# Patient Record
Sex: FEMALE | Race: WHITE | Employment: UNEMPLOYED | URBAN - METROPOLITAN AREA
[De-identification: names, ages, dates, MRNs, and addresses within clinical notes are randomized per-mention and may not be internally consistent; named-entity substitution may affect disease eponyms.]

---

## 2019-03-06 LAB
HBSAG, EXTERNAL: NEGATIVE
HIV, EXTERNAL: NEGATIVE
RPR, EXTERNAL: NON REACTIVE
RUBELLA, EXTERNAL: NORMAL
TYPE, ABO & RH, EXTERNAL: NORMAL

## 2019-08-23 ENCOUNTER — HOSPITAL ENCOUNTER (OUTPATIENT)
Dept: INFUSION THERAPY | Age: 34
Discharge: HOME | End: 2019-08-23
Payer: COMMERCIAL

## 2019-08-23 VITALS
TEMPERATURE: 98.4 F | HEART RATE: 94 BPM | SYSTOLIC BLOOD PRESSURE: 117 MMHG | OXYGEN SATURATION: 99 % | RESPIRATION RATE: 18 BRPM | DIASTOLIC BLOOD PRESSURE: 72 MMHG

## 2019-08-23 LAB
ABO + RH BLD: NORMAL
BLOOD GROUP ANTIBODIES SERPL: NORMAL
SPECIMEN EXP DATE BLD: NORMAL

## 2019-08-23 PROCEDURE — 86900 BLOOD TYPING SEROLOGIC ABO: CPT

## 2019-08-23 PROCEDURE — 74011250636 HC RX REV CODE- 250/636: Performed by: NURSE PRACTITIONER

## 2019-08-23 PROCEDURE — 36415 COLL VENOUS BLD VENIPUNCTURE: CPT

## 2019-08-23 PROCEDURE — 96372 THER/PROPH/DIAG INJ SC/IM: CPT

## 2019-08-23 RX ORDER — LANOLIN ALCOHOL/MO/W.PET/CERES
CREAM (GRAM) TOPICAL
COMMUNITY
End: 2021-07-01

## 2019-08-23 RX ADMIN — HUMAN RHO(D) IMMUNE GLOBULIN 0.3 MG: 300 INJECTION, SOLUTION INTRAMUSCULAR at 11:22

## 2019-08-23 NOTE — PROGRESS NOTES
[facility] Progress Note    Date: 989    Name: Nathalie Chavira    MRN: [id number]         : 1985     RhoGam injection during pregnancy      Ms. Kenan Gracia was assessed and education was provided. [facility] care notes for RhoGam reviewed with and given to patient. Patient had Rho Enrico with her previous pregnancy and denies any reaction at that time. Ms. Jeffery Kay vitals were reviewed and patient was observed for 5 minutes prior to treatment. Visit Vitals  /72 (BP 1 Location: Right arm, BP Patient Position: Sitting)   Pulse 94   Temp 98.4 °F (36.9 °C)   Resp 18   SpO2 99%   Breastfeeding? No       Lab results were obtained and reviewed. Recent Results (from the past 12 hour(s))   TYPE & SCREEN    Collection Time: 19  9:27 AM   Result Value Ref Range    Crossmatch Expiration 2019     ABO/Rh(D) A NEGATIVE     Antibody screen NEG    RH IMMUNE GLOBULIN PROPHYLACTIC    Collection Time: 19  9:27 AM   Result Value Ref Range    No. of weeks gestation 31      CALLED TO: 1 RHIG READY TO A MACE RN INFM AT 1045 19 [facility]     Unit number [id number]     Blood component type RH IMMUNE GLOBULIN     Unit division 00     Status of unit ISSUED        RhoGam 300 mcg was administered IM in left deltoid. No irritation or drainage noted at site, band aid applied. Patient declined to stay for observation as she had her 3year old son with her. Ms. Kenan Gracia tolerated well, and had no complaints. Patient armband removed and shredded. Ms. Kenan Gracia was discharged from [place] [address] in stable condition at 1135. She has no appointment to return to [facility] but will follow up with her OB as scheduled for her next appointment.     Kylie Dominique RN  2019  11:36 AM

## 2019-08-24 LAB
BLD PROD TYP BPU: NORMAL
BPU ID: NORMAL
CALLED TO:,BCALL1: NORMAL
GA (WEEKS): 31 WK
STATUS OF UNIT,%ST: NORMAL
UNIT DIVISION, %UDIV: 0

## 2019-09-18 LAB — GRBS, EXTERNAL: POSITIVE

## 2019-10-14 ENCOUNTER — HOSPITAL ENCOUNTER (EMERGENCY)
Age: 34
Discharge: HOME | End: 2019-10-14
Attending: OBSTETRICS & GYNECOLOGY | Admitting: OBSTETRICS & GYNECOLOGY
Payer: COMMERCIAL

## 2019-10-14 VITALS
TEMPERATURE: 98.3 F | DIASTOLIC BLOOD PRESSURE: 60 MMHG | SYSTOLIC BLOOD PRESSURE: 132 MMHG | BODY MASS INDEX: 34.6 KG/M2 | WEIGHT: 188 LBS | RESPIRATION RATE: 16 BRPM | HEIGHT: 62 IN | HEART RATE: 66 BPM

## 2019-10-14 PROCEDURE — 59025 FETAL NON-STRESS TEST: CPT

## 2019-10-14 PROCEDURE — 99283 EMERGENCY DEPT VISIT LOW MDM: CPT

## 2019-10-14 NOTE — PROGRESS NOTES
1634  at 38.6 weeks ambulated onto unit from office for non-reassuring fetal heart tracing. Taken to labor room 3 and oriented to area. Patient using restroom at this time. 1648 EFM and TOCO applied    3226 Called and left message for Dr. Mimi Chan. 1847 Dr. Mimi Chan on unit. Reviewed reactive fetal heart tracing. Received discharge orders. EFM and TOCO discontinued. 1859 Pt discharged home. Verbal and written discharge instructions given including LOF, vaginal bleeding, kick counts, s/s of active labor, importance of drinking plenty of fluids, eating regular meals, and keeping scheduled appointments. Pt verbalizes understanding. Pt ambulated off unit in stable condition.

## 2019-10-14 NOTE — DISCHARGE INSTRUCTIONS
Patient Education   Patient Education   Patient Education        Learning About When to Call Your Doctor During Pregnancy (After 20 Weeks)  Your Care Instructions  It's common to have concerns about what might be a problem during pregnancy. Although most pregnant women don't have any serious problems, it's important to know when to call your doctor if you have certain symptoms or signs of labor. These are general suggestions. Your doctor may give you some more information about when to call. When to call your doctor (after 20 weeks)  Call 911 anytime you think you may need emergency care. For example, call if:  · You have severe vaginal bleeding. · You have sudden, severe pain in your belly. · You passed out (lost consciousness). · You have a seizure. · You see or feel the umbilical cord. · You think you are about to deliver your baby and can't make it safely to the hospital.  Call your doctor now or seek immediate medical care if:  · You have vaginal bleeding. · You have belly pain. · You have a fever. · You have symptoms of preeclampsia, such as:  ? Sudden swelling of your face, hands, or feet. ? New vision problems (such as dimness, blurring, or seeing spots). ? A severe headache. · You have a sudden release of fluid from your vagina. (You think your water broke.)  · You think that you may be in labor. This means that you've had at least 6 contractions in an hour. · You notice that your baby has stopped moving or is moving much less than normal.  · You have symptoms of a urinary tract infection. These may include:  ? Pain or burning when you urinate. ? A frequent need to urinate without being able to pass much urine. ? Pain in the flank, which is just below the rib cage and above the waist on either side of the back. ? Blood in your urine. Watch closely for changes in your health, and be sure to contact your doctor if:  · You have vaginal discharge that smells bad.   · You have skin changes, such as:  ? A rash. ? Itching. ? Yellow color to your skin. · You have other concerns about your pregnancy. If you have labor signs at 37 weeks or more  If you have signs of labor at 37 weeks or more, your doctor may tell you to call when your labor becomes more active. Symptoms of active labor include:  · Contractions that are regular. · Contractions that are less than 5 minutes apart. · Contractions that are hard to talk through. Follow-up care is a key part of your treatment and safety. Be sure to make and go to all appointments, and call your doctor if you are having problems. It's also a good idea to know your test results and keep a list of the medicines you take. Where can you learn more? Go to http://max-yvan.info/. Enter  in the search box to learn more about \"Learning About When to Call Your Doctor During Pregnancy (After 20 Weeks). \"  Current as of: May 29, 2019  Content Version: 12.2  © 4329-4439 [employer]. Care instructions adapted under license by [employer] (which disclaims liability or warranty for this information). If you have questions about a medical condition or this instruction, always ask your healthcare professional. [place] [address] any warranty or liability for your use of this information. Counting Your Baby's Kicks: Care Instructions  Your Care Instructions    Counting your baby's kicks is one way your doctor can tell that your baby is healthy. Most women--especially in a first pregnancy--feel their baby move for the first time between 16 and 22 weeks. The movement may feel like flutters rather than kicks. Your baby may move more at certain times of the day. When you are active, you may notice less kicking than when you are resting. At your prenatal visits, your doctor will ask whether the baby is active.   In your last trimester, your doctor may ask you to count the number of times you feel your baby move.  Follow-up care is a key part of your treatment and safety. Be sure to make and go to all appointments, and call your doctor if you are having problems. It's also a good idea to know your test results and keep a list of the medicines you take. How do you count fetal kicks? · A common method of checking your baby's movement is to count the number of kicks or moves you feel in 1 hour. Ten movements (such as kicks, flutters, or rolls) in 1 hour are normal. Some doctors suggest that you count in the morning until you get to 10 movements. Then you can quit for that day and start again the next day. · Pick your baby's most active time of day to count. This may be any time from morning to evening. · If you do not feel 10 movements in an hour, your baby may be sleeping. Wait for the next hour and count again. When should you call for help? Call your doctor now or seek immediate medical care if:    · You noticed that your baby has stopped moving or is moving much less than normal.    Watch closely for changes in your health, and be sure to contact your doctor if you have any problems. Where can you learn more? Go to http://max-yvan.info/. Enter Q495 in the search box to learn more about \"Counting Your Baby's Kicks: Care Instructions. \"  Current as of: May 29, 2019  Content Version: 12.2  © 2056-7058 [employer]. Care instructions adapted under license by [employer] (which disclaims liability or warranty for this information). If you have questions about a medical condition or this instruction, always ask your healthcare professional. [address] any warranty or liability for your use of this information. Pregnancy Precautions: Care Instructions  Your Care Instructions    There is no sure way to prevent labor before your due date ( labor) or to prevent most other pregnancy problems.  But there are things you can do to increase your chances of a healthy pregnancy. Go to your appointments, follow your doctor's advice, and take good care of yourself. Eat well, and exercise (if your doctor agrees). And make sure to drink plenty of water. Follow-up care is a key part of your treatment and safety. Be sure to make and go to all appointments, and call your doctor if you are having problems. It's also a good idea to know your test results and keep a list of the medicines you take. How can you care for yourself at home? · Make sure you go to your prenatal appointments. At each visit, your doctor will check your blood pressure. Your doctor will also check to see if you have protein in your urine. High blood pressure and protein in urine are signs of preeclampsia. This condition can be dangerous for you and your baby. · Drink plenty of fluids, enough so that your urine is light yellow or clear like water. Dehydration can cause contractions. If you have kidney, heart, or liver disease and have to limit fluids, talk with your doctor before you increase the amount of fluids you drink. · Tell your doctor right away if you notice any symptoms of an infection, such as:  ? Burning when you urinate. ? A foul-smelling discharge from your vagina. ? Vaginal itching. ? Unexplained fever. ? Unusual pain or soreness in your uterus or lower belly. · Eat a balanced diet. Include plenty of foods that are high in calcium and iron. ? Foods high in calcium include milk, cheese, yogurt, almonds, and broccoli. ? Foods high in iron include red meat, shellfish, poultry, eggs, beans, raisins, whole-grain bread, and leafy green vegetables. · Do not smoke. If you need help quitting, talk to your doctor about stop-smoking programs and medicines. These can increase your chances of quitting for good. · Do not drink alcohol or use illegal drugs. · Follow your doctor's directions about activity.  Your doctor will let you know how much, if any, exercise you can do. · Ask your doctor if you can have sex. If you are at risk for early labor, your doctor may ask you to not have sex. · Take care to prevent falls. During pregnancy, your joints are loose, and your balance is off. Sports such as bicycling, skiing, or in-line skating can increase your risk of falling. And don't ride horses or motorcycles, dive, water ski, scuba dive, or parachute jump while you are pregnant. · Avoid getting very hot. Do not use saunas or hot tubs. Avoid staying out in the sun in hot weather for long periods. Take acetaminophen (Tylenol) to lower a high fever. · Do not take any over-the-counter or herbal medicines or supplements without talking to your doctor or pharmacist first.  When should you call for help? Call 911 anytime you think you may need emergency care. For example, call if:    · You passed out (lost consciousness).     · You have a seizure.     · You have severe vaginal bleeding.     · You have severe pain in your belly or pelvis.     · You have had fluid gushing or leaking from your vagina and you know or think the umbilical cord is bulging into your vagina. If this happens, immediately get down on your knees so your rear end (buttocks) is higher than your head. This will decrease the pressure on the cord until help arrives.   [facility] your doctor now or seek immediate medical care if:    · You have signs of preeclampsia, such as:  ? Sudden swelling of your face, hands, or feet. ? New vision problems (such as dimness, blurring, or seeing spots). ? A severe headache.     · You have any vaginal bleeding.     · You have belly pain or cramping.     · You have a fever.     · You have had regular contractions (with or without pain) for an hour.  This means that you have 8 or more within 1 hour or 4 or more in 20 minutes after you change your position and drink fluids.     · You have a sudden release of fluid from your vagina.     · You have low back pain or pelvic pressure that does not go away.     · You notice that your baby has stopped moving or is moving much less than normal.    Watch closely for changes in your health, and be sure to contact your doctor if you have any problems. Where can you learn more? Go to http://max-yvan.info/. Enter [id number] in the search box to learn more about \"Pregnancy Precautions: Care Instructions. \"  Current as of: May 29, 2019  Content Version: 12.2  © 2521-0328 [employer]. Care instructions adapted under license by [employer] (which disclaims liability or warranty for this information). If you have questions about a medical condition or this instruction, always ask your healthcare professional. [address] any warranty or liability for your use of this information.

## 2019-10-20 ENCOUNTER — HOSPITAL ENCOUNTER (INPATIENT)
Age: 34
LOS: 2 days | Discharge: HOME | End: 2019-10-22
Attending: OBSTETRICS & GYNECOLOGY | Admitting: OBSTETRICS & GYNECOLOGY
Payer: COMMERCIAL

## 2019-10-20 PROBLEM — Z33.1 IUP (INTRAUTERINE PREGNANCY), INCIDENTAL: Status: ACTIVE | Noted: 2019-10-20

## 2019-10-20 LAB
ABO + RH BLD: NORMAL
BASOPHILS # BLD: 0 K/UL (ref 0–0.1)
BASOPHILS NFR BLD: 0 % (ref 0–2)
BLOOD BANK CMNT PATIENT-IMP: NORMAL
BLOOD GROUP ANTIBODIES SERPL: NORMAL
BLOOD GROUP ANTIBODIES SERPL: NORMAL
DIFFERENTIAL METHOD BLD: ABNORMAL
EOSINOPHIL # BLD: 0.1 K/UL (ref 0–0.4)
EOSINOPHIL NFR BLD: 1 % (ref 0–5)
ERYTHROCYTE [DISTWIDTH] IN BLOOD BY AUTOMATED COUNT: 14.7 % (ref 11.6–14.5)
HCT VFR BLD AUTO: 38.6 % (ref 35–45)
HGB BLD-MCNC: 12.5 G/DL (ref 12–16)
LYMPHOCYTES # BLD: 1.9 K/UL (ref 0.9–3.6)
LYMPHOCYTES NFR BLD: 16 % (ref 21–52)
MCH RBC QN AUTO: 28 PG (ref 24–34)
MCHC RBC AUTO-ENTMCNC: 32.4 G/DL (ref 31–37)
MCV RBC AUTO: 86.5 FL (ref 74–97)
MONOCYTES # BLD: 0.9 K/UL (ref 0.05–1.2)
MONOCYTES NFR BLD: 8 % (ref 3–10)
NEUTS SEG # BLD: 8.6 K/UL (ref 1.8–8)
NEUTS SEG NFR BLD: 75 % (ref 40–73)
PLATELET # BLD AUTO: 215 K/UL (ref 135–420)
PMV BLD AUTO: 11.1 FL (ref 9.2–11.8)
RBC # BLD AUTO: 4.46 M/UL (ref 4.2–5.3)
SPECIMEN EXP DATE BLD: NORMAL
WBC # BLD AUTO: 11.5 K/UL (ref 4.6–13.2)

## 2019-10-20 PROCEDURE — 74011250636 HC RX REV CODE- 250/636

## 2019-10-20 PROCEDURE — 75410000003 HC RECOV DEL/VAG/CSECN EA 0.5 HR

## 2019-10-20 PROCEDURE — 75410000002 HC LABOR FEE PER 1 HR

## 2019-10-20 PROCEDURE — 86870 RBC ANTIBODY IDENTIFICATION: CPT

## 2019-10-20 PROCEDURE — 75410000000 HC DELIVERY VAGINAL/SINGLE

## 2019-10-20 PROCEDURE — 74011250637 HC RX REV CODE- 250/637: Performed by: OBSTETRICS & GYNECOLOGY

## 2019-10-20 PROCEDURE — 74011250636 HC RX REV CODE- 250/636: Performed by: OBSTETRICS & GYNECOLOGY

## 2019-10-20 PROCEDURE — 85025 COMPLETE CBC W/AUTO DIFF WBC: CPT

## 2019-10-20 PROCEDURE — 59025 FETAL NON-STRESS TEST: CPT

## 2019-10-20 PROCEDURE — 75410000001 HC DELIVERY VAGINAL/MULTIPLE

## 2019-10-20 PROCEDURE — 99281 EMR DPT VST MAYX REQ PHY/QHP: CPT

## 2019-10-20 PROCEDURE — 65270000029 HC RM PRIVATE

## 2019-10-20 PROCEDURE — 86900 BLOOD TYPING SEROLOGIC ABO: CPT

## 2019-10-20 PROCEDURE — 74011250637 HC RX REV CODE- 250/637

## 2019-10-20 RX ORDER — IBUPROFEN 400 MG/1
800 TABLET ORAL
Status: DISCONTINUED | OUTPATIENT
Start: 2019-10-20 | End: 2019-10-22 | Stop reason: HOSPADM

## 2019-10-20 RX ORDER — PROMETHAZINE HYDROCHLORIDE 25 MG/ML
25 INJECTION, SOLUTION INTRAMUSCULAR; INTRAVENOUS
Status: DISCONTINUED | OUTPATIENT
Start: 2019-10-20 | End: 2019-10-22 | Stop reason: HOSPADM

## 2019-10-20 RX ORDER — ONDANSETRON 2 MG/ML
4 INJECTION INTRAMUSCULAR; INTRAVENOUS
Status: DISCONTINUED | OUTPATIENT
Start: 2019-10-20 | End: 2019-10-20 | Stop reason: HOSPADM

## 2019-10-20 RX ORDER — MINERAL OIL
OIL (ML) ORAL
Status: COMPLETED
Start: 2019-10-20 | End: 2019-10-20

## 2019-10-20 RX ORDER — ZOLPIDEM TARTRATE 5 MG/1
5 TABLET ORAL
Status: DISCONTINUED | OUTPATIENT
Start: 2019-10-20 | End: 2019-10-22 | Stop reason: HOSPADM

## 2019-10-20 RX ORDER — OXYTOCIN/0.9 % SODIUM CHLORIDE 20/1000 ML
PLASTIC BAG, INJECTION (ML) INTRAVENOUS
Status: COMPLETED
Start: 2019-10-20 | End: 2019-10-20

## 2019-10-20 RX ORDER — MINERAL OIL
30 OIL (ML) ORAL AS NEEDED
Status: DISCONTINUED | OUTPATIENT
Start: 2019-10-20 | End: 2019-10-20 | Stop reason: HOSPADM

## 2019-10-20 RX ORDER — HYDROMORPHONE HYDROCHLORIDE 1 MG/ML
1 INJECTION, SOLUTION INTRAMUSCULAR; INTRAVENOUS; SUBCUTANEOUS
Status: DISCONTINUED | OUTPATIENT
Start: 2019-10-20 | End: 2019-10-20 | Stop reason: HOSPADM

## 2019-10-20 RX ORDER — BUTORPHANOL TARTRATE 2 MG/ML
2 INJECTION INTRAMUSCULAR; INTRAVENOUS
Status: DISCONTINUED | OUTPATIENT
Start: 2019-10-20 | End: 2019-10-20 | Stop reason: HOSPADM

## 2019-10-20 RX ORDER — OXYTOCIN/0.9 % SODIUM CHLORIDE 20/1000 ML
999 PLASTIC BAG, INJECTION (ML) INTRAVENOUS ONCE
Status: COMPLETED | OUTPATIENT
Start: 2019-10-20 | End: 2019-10-20

## 2019-10-20 RX ORDER — ACETAMINOPHEN 325 MG/1
650 TABLET ORAL
Status: DISCONTINUED | OUTPATIENT
Start: 2019-10-20 | End: 2019-10-22 | Stop reason: HOSPADM

## 2019-10-20 RX ORDER — LIDOCAINE HYDROCHLORIDE 10 MG/ML
20 INJECTION, SOLUTION EPIDURAL; INFILTRATION; INTRACAUDAL; PERINEURAL AS NEEDED
Status: DISCONTINUED | OUTPATIENT
Start: 2019-10-20 | End: 2019-10-20 | Stop reason: HOSPADM

## 2019-10-20 RX ORDER — NALBUPHINE HYDROCHLORIDE 10 MG/ML
10 INJECTION, SOLUTION INTRAMUSCULAR; INTRAVENOUS; SUBCUTANEOUS
Status: DISCONTINUED | OUTPATIENT
Start: 2019-10-20 | End: 2019-10-20 | Stop reason: HOSPADM

## 2019-10-20 RX ORDER — OXYTOCIN/0.9 % SODIUM CHLORIDE 20/1000 ML
125 PLASTIC BAG, INJECTION (ML) INTRAVENOUS CONTINUOUS
Status: DISCONTINUED | OUTPATIENT
Start: 2019-10-20 | End: 2019-10-20 | Stop reason: HOSPADM

## 2019-10-20 RX ORDER — OXYCODONE AND ACETAMINOPHEN 5; 325 MG/1; MG/1
2 TABLET ORAL
Status: DISCONTINUED | OUTPATIENT
Start: 2019-10-20 | End: 2019-10-22 | Stop reason: HOSPADM

## 2019-10-20 RX ORDER — AMOXICILLIN 250 MG
1 CAPSULE ORAL
Status: DISCONTINUED | OUTPATIENT
Start: 2019-10-20 | End: 2019-10-22 | Stop reason: HOSPADM

## 2019-10-20 RX ORDER — METHYLERGONOVINE MALEATE 0.2 MG/ML
0.2 INJECTION INTRAVENOUS AS NEEDED
Status: DISCONTINUED | OUTPATIENT
Start: 2019-10-20 | End: 2019-10-20 | Stop reason: HOSPADM

## 2019-10-20 RX ORDER — TERBUTALINE SULFATE 1 MG/ML
0.25 INJECTION SUBCUTANEOUS
Status: DISCONTINUED | OUTPATIENT
Start: 2019-10-20 | End: 2019-10-20 | Stop reason: HOSPADM

## 2019-10-20 RX ORDER — SODIUM CHLORIDE, SODIUM LACTATE, POTASSIUM CHLORIDE, CALCIUM CHLORIDE 600; 310; 30; 20 MG/100ML; MG/100ML; MG/100ML; MG/100ML
125 INJECTION, SOLUTION INTRAVENOUS CONTINUOUS
Status: DISCONTINUED | OUTPATIENT
Start: 2019-10-20 | End: 2019-10-20 | Stop reason: HOSPADM

## 2019-10-20 RX ORDER — CLINDAMYCIN PHOSPHATE 900 MG/50ML
900 INJECTION, SOLUTION INTRAVENOUS ONCE
Status: COMPLETED | OUTPATIENT
Start: 2019-10-20 | End: 2019-10-20

## 2019-10-20 RX ADMIN — CLINDAMYCIN PHOSPHATE 900 MG: 900 INJECTION, SOLUTION INTRAVENOUS at 06:25

## 2019-10-20 RX ADMIN — Medication 19980 MILLI-UNITS/HR: at 06:56

## 2019-10-20 RX ADMIN — Medication 125 ML/HR: at 07:14

## 2019-10-20 RX ADMIN — MINERAL 30 ML: 999 OIL ORAL at 06:40

## 2019-10-20 RX ADMIN — IBUPROFEN 800 MG: 400 TABLET, FILM COATED ORAL at 20:40

## 2019-10-20 RX ADMIN — IBUPROFEN 800 MG: 400 TABLET, FILM COATED ORAL at 07:32

## 2019-10-20 RX ADMIN — SODIUM CHLORIDE, SODIUM LACTATE, POTASSIUM CHLORIDE, AND CALCIUM CHLORIDE: 600; 310; 30; 20 INJECTION, SOLUTION INTRAVENOUS at 06:25

## 2019-10-20 NOTE — PROGRESS NOTES
0710 Bedside and Verbal shift change report given to [address] [place] [address] Dr RN  (oncoming nurse) by Joseline Cardenas RN (offgoing nurse). Report included the following information SBAR, Kardex, Procedure Summary, Intake/Output, MAR, Recent Results and Med Rec Status. 1206 TRANSFER - OUT REPORT:    Verbal report given to JENNYFER Melchor RN (name) on Michael Veloz  being transferred to [facility](unit) for routine progression of care       Report consisted of patients Situation, Background, Assessment and   Recommendations(SBAR). Information from the following report(s) SBAR, Kardex, Procedure Summary, Intake/Output, MAR, Recent Results and Med Rec Status was reviewed with the receiving nurse. Lines:   Peripheral IV 10/20/19 Right Antecubital (Active)   Site Assessment Clean, dry, & intact 10/20/2019 11:55 AM   Phlebitis Assessment 0 10/20/2019 11:55 AM   Infiltration Assessment 0 10/20/2019 11:55 AM   Dressing Status Clean, dry, & intact 10/20/2019 11:55 AM   Dressing Type Tape 10/20/2019 11:55 AM   Hub Color/Line Status Pink 10/20/2019 11:55 AM   Alcohol Cap Used Yes 10/20/2019 11:55 AM        Opportunity for questions and clarification was provided.       Patient transported with:   Registered Nurse

## 2019-10-20 NOTE — H&P
Ostetrical History and Physical    Subjective:     Date of Admission: 10/20/2019    Patient is a 26 y.o.  female admitted with labor . Arrived 3 cm rapidly progressed to ant lip. For Obstetric history, see prenantal.    Past Medical History:   Diagnosis Date    Anemia     Breast disorder     lump in left breast (fibroid)    Disease of blood and blood forming organ       No past surgical history on file. Prior to Admission medications    Medication Sig Start Date End Date Taking? Authorizing Provider   PNV Comb #2-Iron-Omega 3-FA (TRUST THERON DHA) 29-1-250 mg cmpk Take  by mouth. Provider, Historical   ferrous sulfate (IRON) 325 mg (65 mg iron) tablet Take  by mouth Daily (before breakfast). Provider, Historical     Allergies   Allergen Reactions    Amoxicillin-Pot Clavulanate Hives      Social History     Tobacco Use    Smoking status: Never Smoker    Smokeless tobacco: Never Used   Substance Use Topics    Alcohol use: Not Currently      No family history on file. Review of Systems    Objective:     Blood pressure 144/85, pulse 72, temperature 98.1 °F (36.7 °C), resp. rate 18, height 5' 2\" (1.575 m), weight 85.3 kg (188 lb). Temp (24hrs), Av.1 °F (36.7 °C), Min:98.1 °F (36.7 °C), Max:98.1 °F (36.7 °C)        No intake/output data recorded. No intake/output data recorded.     @[email or web address]    Pelvic: [id number], Effaced:> 50%Station:0  Data Review:   Recent Results (from the past 24 hour(s))   CBC WITH AUTOMATED DIFF    Collection Time: 10/20/19  6:20 AM   Result Value Ref Range    WBC 11.5 4.6 - 13.2 K/uL    RBC 4.46 4.20 - 5.30 M/uL    HGB 12.5 12.0 - 16.0 g/dL    HCT 38.6 35.0 - 45.0 %    MCV 86.5 74.0 - 97.0 FL    MCH 28.0 24.0 - 34.0 PG    MCHC 32.4 31.0 - 37.0 g/dL    RDW 14.7 (H) 11.6 - 14.5 %    PLATELET 144 643 - 805 K/uL    MPV 11.1 9.2 - 11.8 FL    NEUTROPHILS 75 (H) 40 - 73 %    LYMPHOCYTES 16 (L) 21 - 52 %    MONOCYTES 8 3 - 10 %    EOSINOPHILS 1 0 - 5 %    BASOPHILS 0 0 - 2 % ABS. NEUTROPHILS 8.6 (H) 1.8 - 8.0 K/UL    ABS. LYMPHOCYTES 1.9 0.9 - 3.6 K/UL    ABS. MONOCYTES 0.9 0.05 - 1.2 K/UL    ABS. EOSINOPHILS 0.1 0.0 - 0.4 K/UL    ABS.  BASOPHILS 0.0 0.0 - 0.1 K/UL    DF AUTOMATED       Monitor:  Reactivity:present Variability:present Baseline:within normal limits    Assessment:     Active Problems:    IUP (intrauterine pregnancy), incidental (10/20/2019)        Plan:     Prophylaxis:  Deep Vein Thrombosis Protocol Active:Yes    Check labs:    Check  Prenatal:    Disposition    Total time spent with patient:In-Patient    Signed By: Gela Ortiz MD                         October 20, 2019

## 2019-10-20 NOTE — PROGRESS NOTES
1206  TRANSFER - IN REPORT:    Verbal report received from JIMMY Parker RN (name) on Latoya Blow  being received from labor and delivery(unit) for routine progression of care      Report consisted of patients Situation, Background, Assessment and   Recommendations(SBAR). Information from the following report(s) SBAR, Kardex, Intake/Output, MAR and Recent Results was reviewed with the receiving nurse. Opportunity for questions and clarification was provided. Assessment completed upon patients arrival to unit and care assumed. 1900 Bedside and Verbal shift change report given to JENNY Orona RN and Esha Au RN (oncoming nurse) by Micaela Acosta RN (offgoing nurse). Report included the following information SBAR, Kardex, Intake/Output, MAR and Recent Results.

## 2019-10-20 NOTE — PROGRESS NOTES
Delivery Note    Obstetrician: kory    Assistant: none    Pre-Delivery Diagnosis: Term pregnancy    Post-Delivery Diagnosis: Female    Intrapartum Event: None    Procedure: Spontaneous vaginal delivery    Epidural: NO    Monitor:  Fetal Heart Tones - External and Uterine Contractions - External    Indications for instrumental delivery: none    Estimated Blood Loss:     Episiotomy: none    Laceration(s):  none    Laceration(s) repair: NO    Presentation: Cephalic    Fetal Description: perez    Fetal Position: Occiput Anterior    Birth Weight:     Birth Length:     Apgar - One Minute: 8    Apgar - Five Minutes: 9    Umbilical Cord: True knot and 3 vessels present    Specimens: none           Complications:  none           Cord Blood Results:   Information for the patient's :  Mick Sears [[id number]]   No results found for: PCTABR, ABORH, PCTDIG, BILI, ABORH, ABORHEXT    Prenatal Labs:     Lab Results   Component Value Date/Time    ABO/Rh(D) A NEGATIVE 2019 09:27 AM        Attending Attestation: I was present and scrubbed for the entire procedure    Signed By:  Niurka Buchanan MD     2019

## 2019-10-20 NOTE — PROGRESS NOTES
1900- Bedside report received from ALEKSEY Angeles RN. Pt. Stable. Needs addressed. Callbell within reach. 2040- Pt. Joined at bedside baby. AAOx4. Vital signs stable. Will continue to monitor. Pain 5/10. Educated on pain management. Pain medication administered as ordered. IV located at right [facility], capped. Clean, dry, intact. Whiteboard updated. Educated on plan of care and signs and symptoms to report. Educated on bulb syringe use. No further questions on concerns at this time. Fundus firm at U-1, midline , small rubra lochia. No clots noted. Assessment complete. Callbell within reach. Bed in lowest position. Baby supine, swaddled in bassinet. 2257- Baby transported to nursery supine via bassinet for shift assessment. HUGs and bands present, secure, and verified with MOB prior to leaving room. 2319- Baby transported back to rooming in supine via bassinet. HUGs and bands present, secure, and verified with MOB upon arrival to rooming in. MOB denies any current needs. Bed in lowest position. Call bell within reach. 2353- Pt supine in bed, baby skin to skin. Pt denies any current needs. Bed in lowest position. Call bell within reach. Denies any current pain, rated 0/10.    0140- Pt resting bed comfortably in bed.     0314- Pt bonding with baby in bed. Denies any current needs. Pain 0/10. Bed in lowest position. Call bell within reach. 0423- Pt resting supine in bed, bonding with baby. No needs expressed at this time. Call bell within reach. Bed in lowest position. 7841- INT removed. Cath tip intact. MOB encouraged to feed baby, pt verbalizes understanding. Bed in lowest position. Pain 0/10. Pt denies any current needs. Bed in lowest position. Call bell within reach. 0710- Bedside and Verbal shift change report given to ELLIS Mazariegos LPN (oncoming nurse) by JENNY [address] (offgoing nurse). Report included the following information SBAR, Kardex, Intake/Output, MAR and Recent Results.

## 2019-10-20 NOTE — PROGRESS NOTES
3137 - Patient arrived to unit via ambulation as a  at 39.5 weeks with complaints of contractions every 5 minute starting at 0300. She states that she is unsure of ROM. She states that she was up to void and felt as if her membranes may have ruptured. She reports no LOF on the way to hospital. Patient taken to TLR1 and given gown to change. 9394 - EFM and TOCO applied. Assessment complete. 0530 - SVE 3-4/80/-2  0540 - Patient up to bathroom. Reactive strip reviewed with Katherine Machado RN. Patient off monitor to ambulate. Fany pad given to check for ROM.  0605 - SVE 9.5/100/0  1161 - Dr. Yoli Cerna called via cell phone, report given on patient and SVE. MD headed to unit. Patient moved to BR 4. Orders for admission placed   0618 - IV started, Fluid bolus. 6859 - Clindamycin started. 0630 - patient request epidrual, Dr. Georgia Pollard called via cell. 9615 - Dr. Yoli Cerna at bedside  4781 - SVE 10/100/+2, pushing  6063 -  viable baby girl, placed skin to skin with mom. 4203 - placenta delivered   0700 - fany care performed, pad with ice pack placed. 0710 - Bedside and Verbal shift change report given to JIMMY Parker RN (oncoming nurse) by Anali Garcia RN (offgoing nurse). Report included the following information SBAR, Kardex, Intake/Output, MAR and Recent Results.

## 2019-10-21 LAB
HCT VFR BLD AUTO: 35.2 % (ref 35–45)
HGB BLD-MCNC: 11.3 G/DL (ref 12–16)

## 2019-10-21 PROCEDURE — 85018 HEMOGLOBIN: CPT

## 2019-10-21 PROCEDURE — 74011250636 HC RX REV CODE- 250/636: Performed by: OBSTETRICS & GYNECOLOGY

## 2019-10-21 PROCEDURE — 85461 HEMOGLOBIN FETAL: CPT

## 2019-10-21 PROCEDURE — 85014 HEMATOCRIT: CPT

## 2019-10-21 PROCEDURE — 3E0234Z INTRODUCTION OF SERUM, TOXOID AND VACCINE INTO MUSCLE, PERCUTANEOUS APPROACH: ICD-10-PCS | Performed by: OBSTETRICS & GYNECOLOGY

## 2019-10-21 PROCEDURE — 86900 BLOOD TYPING SEROLOGIC ABO: CPT

## 2019-10-21 PROCEDURE — 36415 COLL VENOUS BLD VENIPUNCTURE: CPT

## 2019-10-21 PROCEDURE — 65270000029 HC RM PRIVATE

## 2019-10-21 RX ADMIN — HUMAN RHO(D) IMMUNE GLOBULIN 0.3 MG: 300 INJECTION, SOLUTION INTRAMUSCULAR at 08:58

## 2019-10-21 NOTE — PROGRESS NOTES
Progress Note    Patient: Karma Colin MRN: [id number]  SSN: [id number]    YOB: 1985  Age: 26 y.o. Sex: female      Subjective:     Postpartum Day: 1     Delivery: vaginal delivery    The patient feels well. The patient denies emotional concerns. The baby iswell. Baby is feeding via breast.  The patient is ambulating well. The patient  tolerating a normal diet. Flatus has been passed. Objective:      Patient Vitals for the past 8 hrs:   BP Temp Pulse Resp SpO2   10/21/19 0757 113/55 98.3 °F (36.8 °C) 75 17 99 %     LABS: Recent Results (from the past 24 hour(s))   HEMOGLOBIN    Collection Time: 10/21/19  4:22 AM   Result Value Ref Range    HGB 11.3 (L) 12.0 - 16.0 g/dL   HEMATOCRIT    Collection Time: 10/21/19  4:22 AM   Result Value Ref Range    HCT 35.2 35.0 - 45.0 %   RH IMMUNE GLOBULIN EVAL-LAB ORDER    Collection Time: 10/21/19  4:22 AM   Result Value Ref Range    ABO/Rh(D) A NEGATIVE     Fetal screen NEG     Cord Blood Type A  POS       CALLED TO: 1 RHIG READY TO Aisha Lim RN 2N AT 6049 10/21/19 [facility]     Unit number [id number]     Blood component type RH IMMUNE GLOBULIN     Unit division 00     Status of unit ALLOCATED           Lochia:  appropriate   Uterine Fundus:   firm   Fundus Location:  -1   Incision:  no significant drainage   DVT Evaluation:  No evidence of DVT seen on physical exam.     Lab/Data Review: All lab results for the last 24 hours reviewed. Assessment:     Status post: Doing well postpartum vaginal delivery     Plan:     Postpartum care discussed including diet, ambulation, and actvitiy restrictions. Discharge instructions and questions answered for vaginal delivery.     Signed By: Garrick Witt MD     October 21, 2019

## 2019-10-21 NOTE — PROGRESS NOTES
Problem: Pain  Goal: *Control of Pain  Outcome: Progressing Towards Goal     Problem: Vaginal Delivery: Postpartum Day 1  Goal: Activity/Safety  Outcome: Progressing Towards Goal  Goal: Consults, if ordered  Outcome: Progressing Towards Goal  Goal: Diagnostic Test/Procedures  Outcome: Progressing Towards Goal  Goal: Nutrition/Diet  Outcome: Progressing Towards Goal  Goal: Discharge Planning  Outcome: Progressing Towards Goal  Goal: Medications  Outcome: Progressing Towards Goal  Goal: Treatments/Interventions/Procedures  Outcome: Progressing Towards Goal  Goal: Psychosocial  Outcome: Progressing Towards Goal  Goal: *Vital signs within defined limits  Outcome: Progressing Towards Goal  Goal: *Labs within defined limits  Outcome: Progressing Towards Goal  Goal: *Hemodynamically stable  Outcome: Progressing Towards Goal  Goal: *Optimal pain control at patient's stated goal  Outcome: Progressing Towards Goal  Goal: *Participates in infant care  Outcome: Progressing Towards Goal  Goal: *Demonstrates progressive activity  Outcome: Progressing Towards Goal  Goal: *Performs self perineal care  Outcome: Progressing Towards Goal  Goal: *Appropriate parent-infant bonding  Outcome: Progressing Towards Goal     CRISTI JUNG

## 2019-10-21 NOTE — PROGRESS NOTES
Problem: Vaginal Delivery: Postpartum Day 1  Goal: Activity/Safety  Outcome: Progressing Towards Goal  Goal: Consults, if ordered  Outcome: Progressing Towards Goal  Goal: Diagnostic Test/Procedures  Outcome: Progressing Towards Goal  Goal: Nutrition/Diet  Outcome: Progressing Towards Goal  Goal: Discharge Planning  Outcome: Progressing Towards Goal  Goal: Medications  Outcome: Progressing Towards Goal  Goal: Treatments/Interventions/Procedures  Outcome: Progressing Towards Goal  Goal: Psychosocial  Outcome: Progressing Towards Goal  Goal: *Vital signs within defined limits  Outcome: Progressing Towards Goal  Goal: *Labs within defined limits  Outcome: Progressing Towards Goal  Goal: *Hemodynamically stable  Outcome: Progressing Towards Goal  Goal: *Optimal pain control at patient's stated goal  Outcome: Progressing Towards Goal  Goal: *Participates in infant care  Outcome: Progressing Towards Goal  Goal: *Demonstrates progressive activity  Outcome: Progressing Towards Goal  Goal: *Performs self perineal care  Outcome: Progressing Towards Goal  Goal: *Appropriate parent-infant bonding  Outcome: Progressing Towards Goal  Goal: *Performs self breast care  Outcome: Progressing Towards Goal

## 2019-10-21 NOTE — PROGRESS NOTES
Assumed care of pt.  0755-assessment completed. Rhogam consent signed. Denies needs. 0858-rhogam given. 0915-VSS. No reaction to rhogam noted. 1045-breast feeding. 1220-breast feeding. 1340-up in room denies needs. 1430-resting in bed. Denies needs. 1550-assessment completed. Denies needs. 1645-resting in bed. Denies needs. 1800-eating dinner. Denies needs. 1915-Bedside and Verbal shift change report given to DARLEEN Stallworth RN  (oncoming nurse) by ELLIS Mazariegos LPN (offgoing nurse). Report given with SBAR, Kardex, Intake/Output, MAR and Recent Results.

## 2019-10-21 NOTE — LACTATION NOTE
Per mom, baby just finished eating, but still wants to suck. Discussed cluster feeding and nutritive vs non nutritive sucking motions. Mom educated on breastfeeding basics--hunger cues, feeding on demand, waking baby if baby sleeps too long between feeds, importance of skin to skin, positioning and latching, risk of pacifier use and supplemental feedings, and importance of rooming in--and use of log sheet. Mom also educated on benefits of breastfeeding for herself and baby. Mom verbalized understanding. No questions at this time.

## 2019-10-22 VITALS
BODY MASS INDEX: 34.6 KG/M2 | RESPIRATION RATE: 16 BRPM | SYSTOLIC BLOOD PRESSURE: 118 MMHG | WEIGHT: 188 LBS | TEMPERATURE: 98.7 F | HEIGHT: 62 IN | HEART RATE: 68 BPM | DIASTOLIC BLOOD PRESSURE: 67 MMHG | OXYGEN SATURATION: 100 %

## 2019-10-22 LAB
ABO + RH BLD: NORMAL
ABO + RH BLDCO: NORMAL
BLD PROD TYP BPU: NORMAL
BPU ID: NORMAL
CALLED TO:,BCALL1: NORMAL
FETAL SCREEN,FMHS: NORMAL
STATUS OF UNIT,%ST: NORMAL
UNIT DIVISION, %UDIV: 0

## 2019-10-22 RX ORDER — IBUPROFEN 800 MG/1
800 TABLET ORAL
Qty: 30 TAB | Refills: 0 | Status: SHIPPED | OUTPATIENT
Start: 2019-10-22 | End: 2021-07-01

## 2019-10-22 NOTE — DISCHARGE INSTRUCTIONS
POST DELIVERY DISCHARGE INSTRUCTIONS    Name: Oscar Duarte  YOB: 1985  Primary Diagnosis: Active Problems:    IUP (intrauterine pregnancy), incidental (10/20/2019)        General:     Diet/Diet Restrictions:  Eight 8-ounce glasses of fluid daily (water, juices); avoid excessive caffeine intake. Meals/snacks as desired which are high in fiber and carbohydrates and low in fat and cholesterol. Physical Activity / Restrictions / Safety:     Avoid heavy lifting, no more that 8 lbs. For 2-3 weeks; Avoid intercourse 4-6 weeks, no douching or tampon use. Check with obstetrician before starting or resuming an exercise program.         Discharge Instructions/Special Treatment/Home Care Needs:     Continue prenatal vitamins. Continue to use squirt bottle with warm water on your episiotomy after each bathroom use until bleeding stops. Call your doctor for the following:     Fever over 100.4 degrees by mouth. Vaginal bleeding heavier than a normal menstrual period or clot larger than a golf ball. Red streaks or increased swelling of legs, painful red streaks on your breast.  Painful urination, constipation and increased pain or swelling or discharge with your incision. If you feel extremely anxious or overwhelmed. If you have thoughts of harming yourself and/or your baby. Pain Management:     Pain Management:   Take Acetaminophen (Tylenol) or Ibuprofen (Advil, Motrin), as directed for pain. Use a warm Sitz bath 3 times daily to relieve episiotomy or hemorrhoidal discomfort. Heating pad to  incision as needed. For hemorrhoidal discomfort, use Tucks and Anusol cream as needed and directed. Follow-Up Care: These are general instructions for a healthy lifestyle:    No smoking/ No tobacco products/ Avoid exposure to second hand smoke    Surgeon General's Warning:  Quitting smoking now greatly reduces serious risk to your health.     Obesity, smoking, and sedentary lifestyle greatly increases your risk for illness    A healthy diet, regular physical exercise & weight monitoring are important for maintaining a healthy lifestyle    Recognize signs and symptoms of STROKE:    F-face looks uneven    A-arms unable to move or move unevenly    S-speech slurred or non-existent    T-time-call 911 as soon as signs and symptoms begin-DO NOT go       Back to bed or wait to see if you get better-TIME IS BRAIN. Patient armband removed and given to patient to take home.   Patient was informed of the privacy risks if armband lost or stolen

## 2019-10-22 NOTE — PROGRESS NOTES
1900 Bedside and Verbal shift change report given to DARLEEN Mcginnis RN (oncoming nurse) by ELLIS Mazariegos LPN (offgoing nurse). Report included the following information SBAR, Kardex, Intake/Output, MAR and Recent Results. 2100 rounding complete . Pt in bed with no needs to be addressed at this time. 2320 shift assessment and vitals complete at this time. No other needs to be met at this time. 0100 pt in bed with no needs to be addressed at this time. Pain 0/10.   0315 . Pt resting in bed with no needs to be addressed at this time. 0510 pt in be at this time . With no needs to be addressed at this time. 0715 Bedside and Verbal shift change report given to ELLIS Mazariegos LPN (oncoming nurse) by Trudi COLIN (offgoing nurse). Report included the following information SBAR, Kardex, Intake/Output, MAR and Recent Results.

## 2019-10-22 NOTE — DISCHARGE SUMMARY
Admit date: 10/20/2019   Admitting Provider: Skye Feliz MD    Discharge date: 10/22/2019  Discharging Provider: Radha Landaverde MD      * Admission Diagnoses: IUP (intrauterine pregnancy), incidental [Z34.90]    * Discharge Diagnoses:    Hospital Problems as of 10/22/2019 Date Reviewed: 10/22/2019          Codes Class Noted - Resolved POA    IUP (intrauterine pregnancy), incidental ICD-10-CM: Z34.90  ICD-9-CM: V22.2  10/20/2019 - Present Unknown              * Hospital Course: Pt underwent  PPD# 2, unremarkable post partum course, doing well, eager to go home. Pt is BF and has good pain control. She will fu in 4 to 6 weeks or sooner as needed. Significant Diagnostic Studies:     Discharge Exam:  Visit Vitals  /67 (BP 1 Location: Left arm, BP Patient Position: At rest)   Pulse 68   Temp 98.7 °F (37.1 °C)   Resp 16   Ht 5' 2\" (1.575 m)   Wt 85.3 kg (188 lb)   SpO2 100%   Breastfeeding? Unknown   BMI 34.39 kg/m²     General:  Alert, cooperative, no distress, appears stated age. Breast Exam:  Not enogorged. Abdomen:   Soft, non-tender. Genitalia:  Minimal lochia   Rectal:     Extremities: Extremities normal                       * Discharge Condition: good  * Disposition: Home    Discharge Medications:  Current Discharge Medication List      START taking these medications    Details   ibuprofen (MOTRIN) 800 mg tablet Take 1 Tab by mouth every eight (8) hours as needed for Pain. Qty: 30 Tab, Refills: 0         CONTINUE these medications which have NOT CHANGED    Details   PNV Comb #2-Iron-Omega 3-FA (TRUST  DHA) 29-1-250 mg cmpk Take  by mouth. ferrous sulfate (IRON) 325 mg (65 mg iron) tablet Take  by mouth Daily (before breakfast). * Follow-up Care/Patient Instructions: Activity: Activity as tolerated  Diet: Regular Diet  Wound Care: Vag hygiene and tucks pads.      Follow-up Information     Follow up With Specialties Details Why Contact Info    None    None (395) Patient stated that they have no PCP            Signed:  Flora Villarreal MD  10/22/2019  12:35 PM

## 2019-10-22 NOTE — LACTATION NOTE
Per mom, infant latching and nursing well. Breastfeeding discharge teaching completed to include feeding on demand, foremilk and hindmilk importance, engorgement, mastitis, clogged ducts, pumping, breastmilk storage, and returning to work. Information given about unit and office phone numbers and encouraged mom to reach out if concerns arise, but that [facility] would be calling her in the next few days to follow up on breastfeeding. Mom verbalized understanding and no questions at this time.

## 2019-10-22 NOTE — PROGRESS NOTES
Discharge teaching complete. Discussed with patient the following informationg; normal vaginal bleeding and what to expect, how to care for perineum and how to respond should she experience an increase in vaginal bleeding. Patient instructed that she should not lift more than the weight of her baby for at least a week. She was encouraged to stay hydrated and informed that she should not have sex, douche, use tampons,  or place anything in the vagina until her postpartum visit. Additionally she was instructed not to use tub baths, hot tubs or pools until cleared by her midwife or physician. Patient was informed that some swelling of her legs can be expected after delivery and to elevate them whenever possible. If the swelling increases or she has signs of calf pain/tenderness, or redness that is present in one leg more than the other she is to notify her provider or present in the emergency department for evaluation if unable to reach provider. Patient was given signs and symptoms of infection and instructed to call provider with temperature equal to or > than 100.4, foul smelling blood or discharge from the vagina. Patient was also instructed on the signs of postpartum depression and instructed that if she is considering harming herself or her infant, feels out of control, unable to care for herself or baby, feels sad or depressed most of the day every day, is having trouble sleeping or sleeping too much or is having trouble bonding with her baby she is to call her provider or present in the emergency department. Patient was also provided with signs and symptoms of a pulmonary embolism (PE). She was told what a PE is and instructed to call 911 if she experiences SOB (fast, shallow, rapid respirations) at rest, chest pain that worsens when coughing or change in her level of consciousness.     Patient was informed that she might experience blood pressure changes during the postpartum weeks and that she should contact her provider with any severe, constant headaches that do not respond to over-the-counter pain medication, rest and/or hydration. She is also to call her provider with any changes in vision, seeing spots, or flashing lights, pain in the upper right quadrant of the abdomen, swelling of the face, hands, and/or legs more than what is expected or a change in her level of consciousness. Patient was strongly encouraged to keep her postpartum appointment and emphasized the importance of telling all providers her delivery date up until one year after the birth of her baby. All questions were answered and patient voiced understanding of the information provided.

## 2019-10-22 NOTE — PROGRESS NOTES
Assumed care of pt.  0730-breast feeding  0835-assessment completed. Denies needs. 1030-ambulating in hallway. 1150-resting in bed. Denies needs. 1240-discharge instructions signed by pt.  1255-discharged home with infant via wheelchair.

## 2019-10-22 NOTE — PROGRESS NOTES
Progress Note                               Patient: Suly Ruiz MRN: [id number]  SSN: [id number]    YOB: 1985  Age: 26 y.o. Sex: female      Postpartum Day Number 2    Subjective:     Patient doing well postpartum without significant complaints. Voiding without difficulty. Patient reports normal lochia. . Breastfeeding: Yes     Objective:     Patient Vitals for the past 18 hrs:   Temp Pulse Resp BP SpO2   10/22/19 0816 98.7 °F (37.1 °C) 68 16 118/67 100 %   10/21/19 2320 98.3 °F (36.8 °C) 69 16 120/67 100 %        Temp (24hrs), Av.2 °F (36.8 °C), Min:97.7 °F (36.5 °C), Max:98.7 °F (37.1 °C)      Physical Exam:    General:   Patient without distress. Abdomen: Soft, fundus firm at level of umbilicus, nontender   Lower Extremities: Negative for swelling, cords, or tenderness. Lab/Data Review: All lab results for the last 24 hours reviewed. Assessment and Plan:   Discharge today. Encourage BF and pain control. Fu in 4 to 6 weeks. Patient appears to be having an uncomplicated postpartum course. Continue routine perineal care and maternal education.

## 2021-01-05 LAB
ANTIBODY SCREEN, EXTERNAL: NEGATIVE
CHLAMYDIA, EXTERNAL: NEGATIVE
HBSAG, EXTERNAL: NEGATIVE
HIV, EXTERNAL: NON REACTIVE
N. GONORRHEA, EXTERNAL: NEGATIVE
RPR, EXTERNAL: NON REACTIVE
RUBELLA, EXTERNAL: NORMAL
TYPE, ABO & RH, EXTERNAL: NORMAL

## 2021-06-30 ENCOUNTER — HOSPITAL ENCOUNTER (OUTPATIENT)
Dept: INFUSION THERAPY | Age: 36
Discharge: HOME | End: 2021-06-30
Payer: COMMERCIAL

## 2021-06-30 LAB
ABO + RH BLD: NORMAL
BLOOD GROUP ANTIBODIES SERPL: NORMAL
SPECIMEN EXP DATE BLD: NORMAL

## 2021-06-30 PROCEDURE — 36415 COLL VENOUS BLD VENIPUNCTURE: CPT

## 2021-06-30 PROCEDURE — 86901 BLOOD TYPING SEROLOGIC RH(D): CPT

## 2021-07-01 ENCOUNTER — HOSPITAL ENCOUNTER (OUTPATIENT)
Dept: INFUSION THERAPY | Age: 36
Discharge: HOME | End: 2021-07-01
Payer: COMMERCIAL

## 2021-07-01 VITALS
SYSTOLIC BLOOD PRESSURE: 114 MMHG | RESPIRATION RATE: 16 BRPM | TEMPERATURE: 98.5 F | OXYGEN SATURATION: 97 % | HEART RATE: 96 BPM | DIASTOLIC BLOOD PRESSURE: 56 MMHG

## 2021-07-01 PROCEDURE — 74011250636 HC RX REV CODE- 250/636: Performed by: OBSTETRICS & GYNECOLOGY

## 2021-07-01 PROCEDURE — 96372 THER/PROPH/DIAG INJ SC/IM: CPT

## 2021-07-01 RX ORDER — GLUCOSAMINE SULFATE 1500 MG
1 POWDER IN PACKET (EA) ORAL DAILY
COMMUNITY
End: 2021-08-01

## 2021-07-01 RX ADMIN — HUMAN RHO(D) IMMUNE GLOBULIN 0.3 MG: 300 INJECTION, SOLUTION INTRAMUSCULAR at 14:20

## 2021-07-01 NOTE — PROGRESS NOTES
[facility] Progress Note    Date: 63    Name: Baron Murillo    MRN: [id number]         : 1985    Rhogam Injection      Ms. Bebo Barrientos to [facility], ambulatory at 1400. Pt was assessed and education was provided. Patient states she is 35 weeks pregnant. Pt given written info about Rhogam; explained reason for giving to Rh negative mothers. Pt verbalized understanding and signed consent form. Ms. Terri Pina vitals were reviewed and patient was observed for 5 minutes prior to treatment. Visit Vitals  BP (!) 114/56 (BP 1 Location: Left arm, BP Patient Position: Sitting)   Pulse 96   Temp 98.5 °F (36.9 °C)   Resp 16   SpO2 97%   Breastfeeding No       Lab results were obtained and reviewed. No results found for this or any previous visit (from the past 12 hour(s)). Pt's blood type is A negative, which is within ordered parameters to give Rhogam today. Rhogam 300 mcg was administered IM in left deltoid. No irritation or bleeding noted at site. Bandaid applied. Ms. Bebo Barrientos tolerated well, and had no complaints. Pt observed for 30 minutes post-injection. She denied itching/hives/rash, SOB, difficulty swallowing or speaking, chest pain, any swelling or any other signs of allergic reaction. Pt given printed copy of discharge instructions; reviewed with her symptoms that require medical attention. Pt given Rhogam card with lot #, expiration date, etc.     Patient armband removed and shredded. Ms. Bebo Barrientos was discharged from [place] [id number] in stable condition at 1450. She is to follow-up with Dr. Irma Downing as instructed.     Eugenio Faust RN  2021  2:39 PM

## 2021-07-02 LAB
BLD PROD TYP BPU: NORMAL
BPU ID: NORMAL
GA (WEEKS): 35 WK
STATUS OF UNIT,%ST: NORMAL
UNIT DIVISION, %UDIV: 0

## 2021-07-10 LAB — GRBS, EXTERNAL: NEGATIVE

## 2021-07-30 ENCOUNTER — HOSPITAL ENCOUNTER (INPATIENT)
Age: 36
LOS: 2 days | Discharge: HOME | End: 2021-08-01
Attending: OBSTETRICS & GYNECOLOGY | Admitting: OBSTETRICS & GYNECOLOGY
Payer: COMMERCIAL

## 2021-07-30 ENCOUNTER — ANESTHESIA EVENT (OUTPATIENT)
Dept: LABOR AND DELIVERY | Age: 36
End: 2021-07-30
Payer: COMMERCIAL

## 2021-07-30 ENCOUNTER — ANESTHESIA (OUTPATIENT)
Dept: LABOR AND DELIVERY | Age: 36
End: 2021-07-30
Payer: COMMERCIAL

## 2021-07-30 PROBLEM — Z3A.39 39 WEEKS GESTATION OF PREGNANCY: Status: ACTIVE | Noted: 2021-07-30

## 2021-07-30 PROBLEM — Z34.90 PREGNANCY: Status: ACTIVE | Noted: 2021-07-30

## 2021-07-30 PROBLEM — Z37.9 NORMAL LABOR: Status: ACTIVE | Noted: 2021-07-30

## 2021-07-30 LAB
ABO + RH BLD: NORMAL
APPEARANCE UR: CLEAR
BASOPHILS # BLD: 0 K/UL (ref 0–0.1)
BASOPHILS NFR BLD: 0 % (ref 0–2)
BILIRUB UR QL: NEGATIVE
BLOOD GROUP ANTIBODIES SERPL: NORMAL
BLOOD GROUP ANTIBODIES SERPL: NORMAL
COLOR UR: ABNORMAL
DIFFERENTIAL METHOD BLD: ABNORMAL
EOSINOPHIL # BLD: 0.1 K/UL (ref 0–0.4)
EOSINOPHIL NFR BLD: 1 % (ref 0–5)
ERYTHROCYTE [DISTWIDTH] IN BLOOD BY AUTOMATED COUNT: 15.9 % (ref 11.6–14.5)
GLUCOSE UR QL STRIP.AUTO: NEGATIVE MG/DL
HCT VFR BLD AUTO: 32.1 % (ref 35–45)
HGB BLD-MCNC: 10.2 G/DL (ref 12–16)
KETONES UR-MCNC: NEGATIVE MG/DL
LEUKOCYTE ESTERASE UR QL STRIP: NEGATIVE
LYMPHOCYTES # BLD: 1.6 K/UL (ref 0.9–3.6)
LYMPHOCYTES NFR BLD: 16 % (ref 21–52)
MCH RBC QN AUTO: 26.6 PG (ref 24–34)
MCHC RBC AUTO-ENTMCNC: 31.8 G/DL (ref 31–37)
MCV RBC AUTO: 83.8 FL (ref 74–97)
MONOCYTES # BLD: 0.7 K/UL (ref 0.05–1.2)
MONOCYTES NFR BLD: 7 % (ref 3–10)
NEUTS SEG # BLD: 7.4 K/UL (ref 1.8–8)
NEUTS SEG NFR BLD: 75 % (ref 40–73)
NITRITE UR QL: NEGATIVE
PH UR: 6 [PH] (ref 5–9)
PLATELET # BLD AUTO: 212 K/UL (ref 135–420)
PMV BLD AUTO: 12.1 FL (ref 9.2–11.8)
PROT UR QL: NEGATIVE MG/DL
RBC # BLD AUTO: 3.83 M/UL (ref 4.2–5.3)
RBC # UR STRIP: ABNORMAL /UL
SERVICE CMNT-IMP: ABNORMAL
SP GR UR: 1.01 (ref 1–1.02)
SPECIMEN EXP DATE BLD: NORMAL
UROBILINOGEN UR QL: 0.2 EU/DL (ref 0.2–1)
WBC # BLD AUTO: 9.8 K/UL (ref 4.6–13.2)

## 2021-07-30 PROCEDURE — 0KQM0ZZ REPAIR PERINEUM MUSCLE, OPEN APPROACH: ICD-10-PCS | Performed by: OBSTETRICS & GYNECOLOGY

## 2021-07-30 PROCEDURE — 81003 URINALYSIS AUTO W/O SCOPE: CPT

## 2021-07-30 PROCEDURE — 86870 RBC ANTIBODY IDENTIFICATION: CPT

## 2021-07-30 PROCEDURE — 85025 COMPLETE CBC W/AUTO DIFF WBC: CPT

## 2021-07-30 PROCEDURE — 75410000003 HC RECOV DEL/VAG/CSECN EA 0.5 HR

## 2021-07-30 PROCEDURE — 75410000000 HC DELIVERY VAGINAL/SINGLE

## 2021-07-30 PROCEDURE — 74011000258 HC RX REV CODE- 258

## 2021-07-30 PROCEDURE — 76060000078 HC EPIDURAL ANESTHESIA

## 2021-07-30 PROCEDURE — 86901 BLOOD TYPING SEROLOGIC RH(D): CPT

## 2021-07-30 PROCEDURE — 00HU33Z INSERTION OF INFUSION DEVICE INTO SPINAL CANAL, PERCUTANEOUS APPROACH: ICD-10-PCS | Performed by: OBSTETRICS & GYNECOLOGY

## 2021-07-30 PROCEDURE — 65270000029 HC RM PRIVATE

## 2021-07-30 PROCEDURE — 77030009363 HC CUP VAC EXTRCT CNCI -B

## 2021-07-30 PROCEDURE — 74011250636 HC RX REV CODE- 250/636: Performed by: OBSTETRICS & GYNECOLOGY

## 2021-07-30 PROCEDURE — 74011250636 HC RX REV CODE- 250/636

## 2021-07-30 PROCEDURE — 74011000250 HC RX REV CODE- 250: Performed by: NURSE ANESTHETIST, CERTIFIED REGISTERED

## 2021-07-30 PROCEDURE — 77030007879 HC KT SPN EPDRL TELE -B: Performed by: SPECIALIST

## 2021-07-30 PROCEDURE — 75410000002 HC LABOR FEE PER 1 HR

## 2021-07-30 RX ORDER — PHENYLEPHRINE HCL IN 0.9% NACL 1 MG/10 ML
80 SYRINGE (ML) INTRAVENOUS AS NEEDED
Status: DISCONTINUED | OUTPATIENT
Start: 2021-07-30 | End: 2021-07-30 | Stop reason: HOSPADM

## 2021-07-30 RX ORDER — ONDANSETRON 2 MG/ML
4 INJECTION INTRAMUSCULAR; INTRAVENOUS
Status: DISCONTINUED | OUTPATIENT
Start: 2021-07-30 | End: 2021-07-30 | Stop reason: HOSPADM

## 2021-07-30 RX ORDER — SODIUM CHLORIDE, SODIUM LACTATE, POTASSIUM CHLORIDE, CALCIUM CHLORIDE 600; 310; 30; 20 MG/100ML; MG/100ML; MG/100ML; MG/100ML
125 INJECTION, SOLUTION INTRAVENOUS CONTINUOUS
Status: DISCONTINUED | OUTPATIENT
Start: 2021-07-30 | End: 2021-07-30 | Stop reason: HOSPADM

## 2021-07-30 RX ORDER — HYDROMORPHONE HYDROCHLORIDE 1 MG/ML
1 INJECTION, SOLUTION INTRAMUSCULAR; INTRAVENOUS; SUBCUTANEOUS
Status: DISCONTINUED | OUTPATIENT
Start: 2021-07-30 | End: 2021-07-30 | Stop reason: HOSPADM

## 2021-07-30 RX ORDER — TERBUTALINE SULFATE 1 MG/ML
0.25 INJECTION SUBCUTANEOUS
Status: DISCONTINUED | OUTPATIENT
Start: 2021-07-30 | End: 2021-07-30 | Stop reason: HOSPADM

## 2021-07-30 RX ORDER — LIDOCAINE HYDROCHLORIDE AND EPINEPHRINE 15; 5 MG/ML; UG/ML
INJECTION, SOLUTION EPIDURAL AS NEEDED
Status: DISCONTINUED | OUTPATIENT
Start: 2021-07-30 | End: 2021-07-30 | Stop reason: HOSPADM

## 2021-07-30 RX ORDER — FENTANYL CITRATE 50 UG/ML
INJECTION, SOLUTION INTRAMUSCULAR; INTRAVENOUS
Status: DISCONTINUED
Start: 2021-07-30 | End: 2021-07-30 | Stop reason: WASHOUT

## 2021-07-30 RX ORDER — NALOXONE HYDROCHLORIDE 0.4 MG/ML
0.2 INJECTION, SOLUTION INTRAMUSCULAR; INTRAVENOUS; SUBCUTANEOUS AS NEEDED
Status: DISCONTINUED | OUTPATIENT
Start: 2021-07-30 | End: 2021-07-30 | Stop reason: HOSPADM

## 2021-07-30 RX ORDER — LIDOCAINE HYDROCHLORIDE 10 MG/ML
INJECTION INFILTRATION; PERINEURAL AS NEEDED
Status: DISCONTINUED | OUTPATIENT
Start: 2021-07-30 | End: 2021-07-30 | Stop reason: HOSPADM

## 2021-07-30 RX ORDER — OXYTOCIN/0.9 % SODIUM CHLORIDE 30/500 ML
87.3 PLASTIC BAG, INJECTION (ML) INTRAVENOUS AS NEEDED
Status: DISCONTINUED | OUTPATIENT
Start: 2021-07-30 | End: 2021-07-30 | Stop reason: HOSPADM

## 2021-07-30 RX ORDER — NALBUPHINE HYDROCHLORIDE 10 MG/ML
10 INJECTION, SOLUTION INTRAMUSCULAR; INTRAVENOUS; SUBCUTANEOUS
Status: DISCONTINUED | OUTPATIENT
Start: 2021-07-30 | End: 2021-07-30 | Stop reason: HOSPADM

## 2021-07-30 RX ORDER — SODIUM CHLORIDE 0.9 % (FLUSH) 0.9 %
5-40 SYRINGE (ML) INJECTION AS NEEDED
Status: DISCONTINUED | OUTPATIENT
Start: 2021-07-30 | End: 2021-07-30 | Stop reason: HOSPADM

## 2021-07-30 RX ORDER — FENTANYL/ROPIVACAINE/NS/PF 2MCG/ML-.1
PLASTIC BAG, INJECTION (ML) EPIDURAL
Status: COMPLETED
Start: 2021-07-30 | End: 2021-07-30

## 2021-07-30 RX ORDER — BUPIVACAINE HYDROCHLORIDE 2.5 MG/ML
INJECTION, SOLUTION EPIDURAL; INFILTRATION; INTRACAUDAL AS NEEDED
Status: DISCONTINUED | OUTPATIENT
Start: 2021-07-30 | End: 2021-07-30 | Stop reason: HOSPADM

## 2021-07-30 RX ORDER — MINERAL OIL
30 OIL (ML) ORAL AS NEEDED
Status: DISCONTINUED | OUTPATIENT
Start: 2021-07-30 | End: 2021-07-30 | Stop reason: HOSPADM

## 2021-07-30 RX ORDER — FENTANYL/ROPIVACAINE/NS/PF 2MCG/ML-.1
1-15 PLASTIC BAG, INJECTION (ML) EPIDURAL
Status: DISCONTINUED | OUTPATIENT
Start: 2021-07-30 | End: 2021-07-30 | Stop reason: HOSPADM

## 2021-07-30 RX ORDER — EPHEDRINE SULFATE/0.9% NACL/PF 50 MG/5 ML
10 SYRINGE (ML) INTRAVENOUS AS NEEDED
Status: DISCONTINUED | OUTPATIENT
Start: 2021-07-30 | End: 2021-07-30 | Stop reason: HOSPADM

## 2021-07-30 RX ORDER — LIDOCAINE HYDROCHLORIDE 10 MG/ML
20 INJECTION, SOLUTION EPIDURAL; INFILTRATION; INTRACAUDAL; PERINEURAL AS NEEDED
Status: DISCONTINUED | OUTPATIENT
Start: 2021-07-30 | End: 2021-07-30 | Stop reason: HOSPADM

## 2021-07-30 RX ORDER — OXYTOCIN/RINGER'S LACTATE 30/500 ML
10 PLASTIC BAG, INJECTION (ML) INTRAVENOUS AS NEEDED
Status: DISCONTINUED | OUTPATIENT
Start: 2021-07-30 | End: 2021-07-30 | Stop reason: HOSPADM

## 2021-07-30 RX ORDER — METHYLERGONOVINE MALEATE 0.2 MG/ML
0.2 INJECTION INTRAVENOUS AS NEEDED
Status: DISCONTINUED | OUTPATIENT
Start: 2021-07-30 | End: 2021-07-30 | Stop reason: HOSPADM

## 2021-07-30 RX ORDER — SODIUM CHLORIDE 0.9 % (FLUSH) 0.9 %
5-40 SYRINGE (ML) INJECTION EVERY 8 HOURS
Status: DISCONTINUED | OUTPATIENT
Start: 2021-07-30 | End: 2021-07-30 | Stop reason: HOSPADM

## 2021-07-30 RX ORDER — BUTORPHANOL TARTRATE 2 MG/ML
2 INJECTION INTRAMUSCULAR; INTRAVENOUS
Status: DISCONTINUED | OUTPATIENT
Start: 2021-07-30 | End: 2021-07-30 | Stop reason: HOSPADM

## 2021-07-30 RX ADMIN — Medication 12 ML/HR: at 17:34

## 2021-07-30 RX ADMIN — LIDOCAINE HYDROCHLORIDE,EPINEPHRINE BITARTRATE 3 ML: 15; .005 INJECTION, SOLUTION EPIDURAL; INFILTRATION; INTRACAUDAL; PERINEURAL at 17:31

## 2021-07-30 RX ADMIN — BUPIVACAINE HYDROCHLORIDE 4 MG: 2.5 INJECTION, SOLUTION EPIDURAL; INFILTRATION; INTRACAUDAL; PERINEURAL at 17:32

## 2021-07-30 RX ADMIN — ROPIVACAINE HYDROCHLORIDE 12 ML/HR: 5 INJECTION, SOLUTION EPIDURAL; INFILTRATION; PERINEURAL at 17:34

## 2021-07-30 RX ADMIN — BUPIVACAINE HYDROCHLORIDE 4 MG: 2.5 INJECTION, SOLUTION EPIDURAL; INFILTRATION; INTRACAUDAL; PERINEURAL at 17:34

## 2021-07-30 RX ADMIN — LIDOCAINE HYDROCHLORIDE 3 ML: 10 INJECTION, SOLUTION INFILTRATION; PERINEURAL at 17:11

## 2021-07-30 RX ADMIN — SODIUM CHLORIDE, SODIUM LACTATE, POTASSIUM CHLORIDE, AND CALCIUM CHLORIDE 500 ML: 600; 310; 30; 20 INJECTION, SOLUTION INTRAVENOUS at 15:50

## 2021-07-30 RX ADMIN — LIDOCAINE HYDROCHLORIDE 3 ML: 10 INJECTION, SOLUTION INFILTRATION; PERINEURAL at 17:20

## 2021-07-30 NOTE — H&P
Ostetrical History and Physical    Subjective:     Date of Admission: 2021    Patient is a 38 y.o.   female admitted with labor at 39w. For Obstetric history, see markusl.    For Review of Systems, see prenatal    Past Medical History:   Diagnosis Date    Anemia     Breast disorder     lump in left breast (fibroid)    Disease of blood and blood forming organ       History reviewed. No pertinent surgical history. Prior to Admission medications    Medication Sig Start Date End Date Taking? Authorizing Provider   cholecalciferol (Vitamin D3) 25 mcg (1,000 unit) cap Take 1 Units/day by mouth daily. Patient unsure of dose of Vitamin D   Yes Provider, Historical   PNV Comb #2-Iron-Omega 3-FA ([facility] THERON DHA) 29-1-250 mg cmpk Take  by mouth. Yes Provider, Historical     Allergies   Allergen Reactions    Amoxicillin-Pot Clavulanate Hives      Social History     Tobacco Use    Smoking status: Never Smoker    Smokeless tobacco: Never Used   Substance Use Topics    Alcohol use: Not Currently      History reviewed. No pertinent family history. Objective:     Blood pressure (!) 143/86, pulse 91, temperature 99.1 °F (37.3 °C), resp. rate 14, height 5' 2\" (1.575 m), weight 93.4 kg (205 lb 12.8 oz), not currently breastfeeding. Temp (24hrs), Av.1 °F (37.3 °C), Min:99.1 °F (37.3 °C), Max:99.1 °F (37.3 °C)        No intake/output data recorded. No intake/output data recorded. HEENT: No pallor, no jaundice, Thyroid and throat normal  RESPIRATORY: Clear to A & P  CVS: pulse reg, HS normal  ABDOMEN: Gravid. Vertex. EFW=7.5lb +-1lb. No abnormal tenderness.    Pelvic: Cervix 8, (by RN)    Data Review:   Recent Results (from the past 24 hour(s))   POC URINE MACROSCOPIC    Collection Time: 21  3:10 PM   Result Value Ref Range    Color Light Yellow      Appearance CLEAR      Spec. gravity (POC) 1.010 1.001 - 1.023      pH, urine  (POC) 6.0 5.0 - 9.0      Protein (POC) Negative NEG mg/dL Glucose, urine (POC) Negative NEG mg/dL    Ketones (POC) Negative NEG mg/dL    Bilirubin (POC) Negative NEG      Blood (POC) SMALL (A) NEG      Urobilinogen (POC) 0.2 0.2 - 1.0 EU/dL    Nitrite (POC) Negative NEG      Leukocyte esterase (POC) Negative NEG      Performed by Elysia Weiss    CBC WITH AUTOMATED DIFF    Collection Time: 21  3:50 PM   Result Value Ref Range    WBC 9.8 4.6 - 13.2 K/uL    RBC 3.83 (L) 4.20 - 5.30 M/uL    HGB 10.2 (L) 12.0 - 16.0 g/dL    HCT 32.1 (L) 35.0 - 45.0 %    MCV 83.8 74.0 - 97.0 FL    MCH 26.6 24.0 - 34.0 PG    MCHC 31.8 31.0 - 37.0 g/dL    RDW 15.9 (H) 11.6 - 14.5 %    PLATELET 155 788 - 666 K/uL    MPV 12.1 (H) 9.2 - 11.8 FL    NEUTROPHILS 75 (H) 40 - 73 %    LYMPHOCYTES 16 (L) 21 - 52 %    MONOCYTES 7 3 - 10 %    EOSINOPHILS 1 0 - 5 %    BASOPHILS 0 0 - 2 %    ABS. NEUTROPHILS 7.4 1.8 - 8.0 K/UL    ABS. LYMPHOCYTES 1.6 0.9 - 3.6 K/UL    ABS. MONOCYTES 0.7 0.05 - 1.2 K/UL    ABS. EOSINOPHILS 0.1 0.0 - 0.4 K/UL    ABS. BASOPHILS 0.0 0.0 - 0.1 K/UL    DF AUTOMATED       Monitor:  Reactivity:present Variability:present Baseline:within normal limits    Assessment:     Active Problems:    Pregnancy (2021)      Normal labor (2021)      39 weeks gestation of pregnancy (2021)        Plan:     Epidural if possible as requested    Check labs:  CBC  Check  Prenatal:    Disposition:     Type of admit:In-Patient    I saw and examined patient.     Signed By: Josué Ace MD                         2021

## 2021-07-30 NOTE — ANESTHESIA PROCEDURE NOTES
Epidural Block    Patient location during procedure: OB  Start time: 7/30/2021 5:10 PM  End time: 7/30/2021 5:35 PM  Reason for block: labor epidural  Staffing  Performed: CRNA   Resident/CRNA: Luke El CRNA  Preanesthetic Checklist  Completed: patient identified, IV checked, site marked, risks and benefits discussed, surgical consent, monitors and equipment checked, pre-op evaluation and timeout performed  Block Placement  Patient position: sitting  Prep: ChloraPrep  Sterility prep: cap, drape, gloves, hand and mask  Sedation level: no sedation  Patient monitoring: heart rate, frequent blood pressure checks and continuous pulse oximetry  Approach: midline  Location: lumbar  Lumbar location: L3-L4  Epidural  Loss of resistance technique: saline  Guidance: ultrasound guided  Needle  Needle type: Tuohy   Needle gauge: 17 G  Needle length: 9 cm  Needle insertion depth: 5.5 cm  Catheter type: end hole  Catheter size: 19 G  Catheter at skin depth: 10.5 cm  Catheter securement method: clear occlusive dressing, liquid medical adhesive and surgical tape  Test dose: negative  Assessment  Block outcome: pain improved  Number of attempts: 2  Procedure assessment: patient tolerated procedure well with no immediate complications

## 2021-07-30 NOTE — L&D DELIVERY NOTE
Vaginal Delivery Procedure Note    Name: Julian Zambrano   Medical Record Number: [id number]   YOB: 1985  Today's Date: 2021        Procedure: VAGINAL DELIVERY with suction     Anesthesia: epidural    Extra Procedure Details:  1)After the head had reached +4 staion, dilating perineum, Kiwi wire connected suction was applied to the fetal head. Slightly towards the occiput,. With a single contraction, the handle was pumped to achieve suction in the green band. Gentle steady traction was then applied in an upward direction, until delivery. # of pop-offs: 0  #of contractions with suction: 1  Indication for suction: decreased FHTs with prolongation of decel between contractions        2) MANAGEMENT OF SHOULDER DYSTOCIA:  After delivery of the fetal head, turtle sign was apparent. Hips were both flexed  I attempted:    1)  To move the anterior shoulder (L) forward. This was unsuccessful. 2)   The posterior shoulder (R) was then grasped by the axilla, moved upwards, and outwards. As soon as possible I was able to push the anterior shoulder down and outwards, with a corkscrew motion so delivering R shouder first. The baby was then delivered using the axillae for traction. NOTE: at no time was any pressure, or traction, gentle or otherwise, applied to the fetal head. NOTE: time from delivery of the head, to delivery of the body, 45 secs. Estimated Blood Loss: 400     Fetal Description: perez male     Anterior shoulder: left, right delivered first because of rotation.     Umbilical Cord: Nuchal Cord x  2, 3 vessel    Episiotomy: yes   Tear: noType:midline   Degree: 2nd degree   Repair:   In layers 2/0 cc to vag mucosa    2/0vicryl to deep and to perienum             Cord Blood Results:   Information for the patient's :  Wes Stolln [[id number]]   @AB@       Birth Information:   Information for the patient's :  Wes Stolln [[id number]]           Apgars 2,9 at 1 and 5 min respectively    Specimens: Placenta was not sent     Placenta:    Spontaneous with assist and apparently intact on exam          Complications:  NRFHTs  Shoulder dystocia     Birth Weight: 9lb 5oz  Mother's Condition: good  Baby's Condition: good  Sponge and needle count:    correct    I was present for the delivery.  Delivered for  our practice    Diagnoses associated with pregnancy:  Active Problems:    Pregnancy (7/30/2021)      Normal labor (7/30/2021)      39 weeks gestation of pregnancy (7/30/2021)        Signed: Kecia Torres MD      July 30, 2021

## 2021-07-30 NOTE — PROGRESS NOTES
1455- Patient presents to labor and delivery  39weeks 5days for contractions and patient unsure if ruptured. TOCO and ultrasound applied, abdomin soft to palpate, triage assessment performed    1534- SVE- 8cm, positive Amnisure    1537- Patient moved to  4 for admission     1630- Dr. Vianney Brasher made aware of patient      [phone]- Lucy Ny, CRNA aware of patient request for epidural     1700- GERMAIN Goff at bedside for epidural     1710- Timeout complete    173- Catheter in place, test dose given    173- DR. Vianney Brasher at bedside, SVE- complete, AROM- clear    174- Begin to push    1750- Vacuum applied, Dr. Lele Schroeder at bedside, 1 pull    1753-Shoulder dystocia called    175- TOB, live male infant, nuchal x2, apgars- 2/9, 9lb 5oz    1915- Bedside and Verbal shift change report given to JENNIFER Mack RN  (oncoming nurse) by JENNIFER Chowdhury RN  (offgoing nurse). Report included the following information SBAR, Kardex and MAR.

## 2021-07-30 NOTE — ANESTHESIA PREPROCEDURE EVALUATION
Relevant Problems   No relevant active problems       Anesthetic History   No history of anesthetic complications            Review of Systems / Medical History  Patient summary reviewed, nursing notes reviewed and pertinent labs reviewed    Pulmonary  Within defined limits                 Neuro/Psych   Within defined limits           Cardiovascular  Within defined limits                     GI/Hepatic/Renal  Within defined limits              Endo/Other        Anemia     Other Findings              Physical Exam    Airway  Mallampati: II  TM Distance: 4 - 6 cm  Neck ROM: normal range of motion   Mouth opening: Normal     Cardiovascular  Regular rate and rhythm,  S1 and S2 normal,  no murmur, click, rub, or gallop             Dental  No notable dental hx       Pulmonary  Breath sounds clear to auscultation               Abdominal  GI exam deferred       Other Findings            Anesthetic Plan    ASA: 2  Anesthesia type: epidural            Anesthetic plan and risks discussed with: Patient

## 2021-07-31 LAB
HCT VFR BLD AUTO: 29 % (ref 35–45)
HGB BLD-MCNC: 9.2 G/DL (ref 12–16)

## 2021-07-31 PROCEDURE — 74011250637 HC RX REV CODE- 250/637: Performed by: OBSTETRICS & GYNECOLOGY

## 2021-07-31 PROCEDURE — 36415 COLL VENOUS BLD VENIPUNCTURE: CPT

## 2021-07-31 PROCEDURE — 65270000029 HC RM PRIVATE

## 2021-07-31 PROCEDURE — 86901 BLOOD TYPING SEROLOGIC RH(D): CPT

## 2021-07-31 PROCEDURE — 85461 HEMOGLOBIN FETAL: CPT

## 2021-07-31 PROCEDURE — 74011250636 HC RX REV CODE- 250/636: Performed by: OBSTETRICS & GYNECOLOGY

## 2021-07-31 PROCEDURE — 85018 HEMOGLOBIN: CPT

## 2021-07-31 RX ORDER — ZOLPIDEM TARTRATE 5 MG/1
5 TABLET ORAL
Status: DISCONTINUED | OUTPATIENT
Start: 2021-07-31 | End: 2021-08-01 | Stop reason: HOSPADM

## 2021-07-31 RX ORDER — ACETAMINOPHEN 325 MG/1
650 TABLET ORAL
Status: DISCONTINUED | OUTPATIENT
Start: 2021-07-31 | End: 2021-08-01 | Stop reason: HOSPADM

## 2021-07-31 RX ORDER — PROMETHAZINE HYDROCHLORIDE 25 MG/ML
25 INJECTION, SOLUTION INTRAMUSCULAR; INTRAVENOUS
Status: DISCONTINUED | OUTPATIENT
Start: 2021-07-31 | End: 2021-08-01 | Stop reason: HOSPADM

## 2021-07-31 RX ORDER — LANOLIN ALCOHOL/MO/W.PET/CERES
1 CREAM (GRAM) TOPICAL EVERY OTHER DAY
Status: DISCONTINUED | OUTPATIENT
Start: 2021-07-31 | End: 2021-08-01 | Stop reason: HOSPADM

## 2021-07-31 RX ORDER — AMOXICILLIN 250 MG
1 CAPSULE ORAL
Status: DISCONTINUED | OUTPATIENT
Start: 2021-07-31 | End: 2021-08-01 | Stop reason: HOSPADM

## 2021-07-31 RX ORDER — OXYCODONE AND ACETAMINOPHEN 5; 325 MG/1; MG/1
2 TABLET ORAL
Status: DISCONTINUED | OUTPATIENT
Start: 2021-07-31 | End: 2021-08-01 | Stop reason: HOSPADM

## 2021-07-31 RX ORDER — IBUPROFEN 400 MG/1
800 TABLET ORAL
Status: DISCONTINUED | OUTPATIENT
Start: 2021-07-31 | End: 2021-08-01 | Stop reason: HOSPADM

## 2021-07-31 RX ADMIN — IBUPROFEN 800 MG: 400 TABLET ORAL at 03:51

## 2021-07-31 RX ADMIN — FERROUS SULFATE TAB 325 MG (65 MG ELEMENTAL FE) 325 MG: 325 (65 FE) TAB at 13:32

## 2021-07-31 RX ADMIN — IBUPROFEN 800 MG: 400 TABLET ORAL at 13:33

## 2021-07-31 RX ADMIN — HUMAN RHO(D) IMMUNE GLOBULIN 0.3 MG: 300 INJECTION, SOLUTION INTRAMUSCULAR at 17:57

## 2021-07-31 NOTE — PROGRESS NOTES
2125: TRANSFER - IN REPORT:  Verbal report received from JENNIFER Mack RN (name) on Julian Zambrano  being received from L&D (unit) for routine progression of care      Report consisted of patients Situation, Background, Assessment and Recommendations(SBAR). Information from the following report(s) SBAR, Kardex, Procedure Summary, Intake/Output, MAR and Recent Results was reviewed with the receiving nurse. Opportunity for questions and clarification was provided. Assessment completed upon patients arrival to unit and care assumed. 0720: Bedside and Verbal shift change report given to DILSHAD Younger RN (oncoming nurse) by Royden Gowers, RN (offgoing nurse). Report included the following information SBAR, Kardex, Procedure Summary, Intake/Output, MAR and Recent Results.

## 2021-07-31 NOTE — ANESTHESIA POSTPROCEDURE EVALUATION
7/31/2021  3:55 PM    Laboring Epidural Follow-up Note     Referring physician: Kecia Torres MD   Patient status post vaginal delivery with labor epidural    Visit Vitals  BP (P) 126/69 (BP 1 Location: Left upper arm, BP Patient Position: At rest;Lying)   Pulse (P) 64   Temp (P) 36.6 °C (97.8 °F)   Resp (P) 18   Ht 5' 2\" (1.575 m)   Wt 93.4 kg (205 lb 12.8 oz)   SpO2 (P) 100%   Breastfeeding Unknown   BMI 37.64 kg/m²       Epidural removed by L&D staff  No sedation, pruritis noted. Adequate analgesia. No obvious anesthesia complications          Vanessa A May-Such, CRNA      epidural    <BSHSIANPOST>    INITIAL Post-op Vital signs: No vitals data found for the desired time range.

## 2021-07-31 NOTE — PROGRESS NOTES
2125: TRANSFER - IN REPORT:    Verbal report received from JENNIFER Beach, RN (name) on Mario Short  being received from L&D (unit) for routine progression of care      Report consisted of patients Situation, Background, Assessment and   Recommendations(SBAR). Information from the following report(s) SBAR, Kardex, Procedure Summary, Intake/Output, MAR and Recent Results was reviewed with the receiving nurse. Opportunity for questions and clarification was provided. Assessment completed upon patients arrival to unit and care assumed. 2145: Shift assessment complete. Patient denies headache, visual disturbances, and right epigastic pain at this time. Breath sounds clear bilaterally. Patient is not passing gas since delivering, bowel sounds active, with last BM being 7/30. Fundus firm at umbilicus with scant lochia, no clots noted on assessment. IV site clean, dry, and intact. IV site flushed with normal saline to avoid clotting as there was blood up the catheter tubing. No edema in upper extremities, no in lower extremities. Patient free of clonus in lower extremities and denying any pain/tenderness behind knees or in calves. Patient rating pain 0/10 and no pain medication at this time. Patient educated to notify nursing staff of: SOB, chest pain/heaviness, blurred vision, lightheadedness, increase in bleeding, and passing of clots, patient verbalized understanding. Patient stated that she wanted to take a short nap. It was agreed on by nurse and patient to not come into room unless patient presses call bell or until around 0100 or 0200 in the morning. 2320: Patient called to notify nurse that infant can be taken for its assessment. 0000: Infant taken from mother's room for assessment. Patient awake in bed. 3949: Infant taken back to mother's room. Patient was asleep upon arrival. Patient woken up to notify that infant has returned from assessment. Patient educated on bulb syringe usage. Mother stated she has prior experience with bulb syringe with her previous children. No questions or concerns at this time. 0340: Patient asleep upon entering room. Patient woken up to clarify if infant was fed as there was no new feed log. Patient stated that infant has not fed but an attempt was done around 2200 and 0000 per patient. Mom educated on breastfeeding basics--hunger cues, feeding on demand, waking baby if baby sleeps too long between feeds, importance of skin to skin, positioning and latching, risk of pacifier use and supplemental feedings, and importance of rooming in--and use of log sheet. Mom verbalized understanding. No questions at this time. 8461: Patient given Motrin for pain 5/10. No other needs or concerns at this time. 0510: Patient asleep in bed. Infant in bassinet beside patient's bed.    5794: Patient asleep in bed with infant in bassinet by her bed.    0720: Bedside and Verbal shift change report given to DILSHAD Younger RN (oncoming nurse) by Fredo Pope. Maximiliano Childress RN and Simin Palomares RN (offgoing nurse). Report included the following information SBAR, Kardex, Procedure Summary, Intake/Output, MAR and Recent Results.

## 2021-07-31 NOTE — PROGRESS NOTES
Post-Partum Day Number 1 Progress Note    Isaac Ebro     Assessment:   Hospital Problems  Date Reviewed: 2021        Codes Class Noted POA    Pregnancy ICD-10-CM: Z34.90  ICD-9-CM: V22.2  2021 Yes        Normal labor ICD-10-CM: O80, Z37.9  ICD-9-CM: 720  2021 Yes        39 weeks gestation of pregnancy ICD-10-CM: Z3A.39  ICD-9-CM: V22.2  2021 Yes            Doing well, post partum day 1    Plan:  1. Continue routine postpartum and perineal care as well as maternal education. 2. The risks and benefits of the circumcision  procedure and anesthesia including: bleeding, infection, variability of cosmetic results were discussed at length with the mother. She is aware that future repeat procedures may be necessary. She gives informed consent to proceed as noted and her questions are answered. Information for the patient's :  Pat Crowley [[id number]]   Vaginal, Vacuum (Extractor)    Patient doing well without significant complaint. Voiding without difficulty, normal lochia. Current Facility-Administered Medications   Medication Dose Route Frequency       Vitals:  Visit Vitals  /74 (BP 1 Location: Left upper arm, BP Patient Position: At rest;Lying)   Pulse 76   Temp 97.3 °F (36.3 °C)   Resp 18   Ht 5' 2\" (1.575 m)   Wt 205 lb 12.8 oz (93.4 kg)   SpO2 100%   Breastfeeding Unknown   BMI 37.64 kg/m²     Temp (24hrs), Av.5 °F (36.9 °C), Min:97.3 °F (36.3 °C), Max:99.1 °F (37.3 °C)        Exam:   Patient without distress. Abdomen soft, fundus firm, nontender                Perineum with normal lochia noted. Lower extremities are negative for swelling, cords or tenderness.     Labs:     Lab Results   Component Value Date/Time    WBC 9.8 2021 03:50 PM    WBC 11.5 10/20/2019 06:20 AM    HGB 9.2 (L) 2021 04:36 AM    HGB 10.2 (L) 2021 03:50 PM    HGB 11.3 (L) 10/21/2019 04:22 AM    HCT 29.0 (L) 2021 04:36 AM    HCT 32.1 (L) 07/30/2021 03:50 PM    HCT 35.2 10/21/2019 04:22 AM    PLATELET 246 [birth year]1957 03:50 PM    PLATELET 909 12/01/2086 06:20 AM       Recent Results (from the past 24 hour(s))   POC URINE MACROSCOPIC    Collection Time: 07/30/21  3:10 PM   Result Value Ref Range    Color Light Yellow      Appearance CLEAR      Spec. gravity (POC) 1.010 1.001 - 1.023      pH, urine  (POC) 6.0 5.0 - 9.0      Protein (POC) Negative NEG mg/dL    Glucose, urine (POC) Negative NEG mg/dL    Ketones (POC) Negative NEG mg/dL    Bilirubin (POC) Negative NEG      Blood (POC) SMALL (A) NEG      Urobilinogen (POC) 0.2 0.2 - 1.0 EU/dL    Nitrite (POC) Negative NEG      Leukocyte esterase (POC) Negative NEG      Performed by Stella Torres    CBC WITH AUTOMATED DIFF    Collection Time: 07/30/21  3:50 PM   Result Value Ref Range    WBC 9.8 4.6 - 13.2 K/uL    RBC 3.83 (L) 4.20 - 5.30 M/uL    HGB 10.2 (L) 12.0 - 16.0 g/dL    HCT 32.1 (L) 35.0 - 45.0 %    MCV 83.8 74.0 - 97.0 FL    MCH 26.6 24.0 - 34.0 PG    MCHC 31.8 31.0 - 37.0 g/dL    RDW 15.9 (H) 11.6 - 14.5 %    PLATELET 648 467 - 798 K/uL    MPV 12.1 (H) 9.2 - 11.8 FL    NEUTROPHILS 75 (H) 40 - 73 %    LYMPHOCYTES 16 (L) 21 - 52 %    MONOCYTES 7 3 - 10 %    EOSINOPHILS 1 0 - 5 %    BASOPHILS 0 0 - 2 %    ABS. NEUTROPHILS 7.4 1.8 - 8.0 K/UL    ABS. LYMPHOCYTES 1.6 0.9 - 3.6 K/UL    ABS. MONOCYTES 0.7 0.05 - 1.2 K/UL    ABS. EOSINOPHILS 0.1 0.0 - 0.4 K/UL    ABS.  BASOPHILS 0.0 0.0 - 0.1 K/UL    DF AUTOMATED     TYPE & SCREEN    Collection Time: 07/30/21  3:50 PM   Result Value Ref Range    Crossmatch Expiration 08/02/2021,2359     ABO/Rh(D) A NEGATIVE     Antibody screen POS     Antibody ID anti-D, recent RHIG injection    HGB & HCT    Collection Time: 07/31/21  4:36 AM   Result Value Ref Range    HGB 9.2 (L) 12.0 - 16.0 g/dL    HCT 29.0 (L) 35.0 - 45.0 %

## 2021-07-31 NOTE — PROGRESS NOTES
Problem: Patient Education: Go to Patient Education Activity  Goal: Patient/Family Education  Outcome: Progressing Towards Goal     Problem: Vaginal Delivery: Day of Delivery-Post delivery  Goal: Activity/Safety  Outcome: Progressing Towards Goal  Goal: Consults, if ordered  Outcome: Progressing Towards Goal  Goal: Nutrition/Diet  Outcome: Progressing Towards Goal  Goal: Discharge Planning  Outcome: Progressing Towards Goal  Goal: Medications  Outcome: Progressing Towards Goal  Goal: Treatments/Interventions/Procedures  Outcome: Progressing Towards Goal  Goal: *Vital signs within defined limits  Outcome: Progressing Towards Goal  Goal: *Labs within defined limits  Outcome: Progressing Towards Goal  Goal: *Hemodynamically stable  Outcome: Progressing Towards Goal  Goal: *Optimal pain control at patient's stated goal  Outcome: Progressing Towards Goal  Goal: *Participates in infant care  Outcome: Progressing Towards Goal  Goal: *Demonstrates progressive activity  Outcome: Progressing Towards Goal  Goal: *Tolerating diet  Outcome: Progressing Towards Goal     Problem: Vaginal Delivery: Postpartum Day 1  Goal: Activity/Safety  Outcome: Progressing Towards Goal  Goal: Consults, if ordered  Outcome: Progressing Towards Goal  Goal: Diagnostic Test/Procedures  Outcome: Progressing Towards Goal  Goal: Nutrition/Diet  Outcome: Progressing Towards Goal  Goal: Discharge Planning  Outcome: Progressing Towards Goal  Goal: Medications  Outcome: Progressing Towards Goal  Goal: Treatments/Interventions/Procedures  Outcome: Progressing Towards Goal  Goal: Psychosocial  Outcome: Progressing Towards Goal  Goal: *Vital signs within defined limits  Outcome: Progressing Towards Goal  Goal: *Labs within defined limits  Outcome: Progressing Towards Goal  Goal: *Hemodynamically stable  Outcome: Progressing Towards Goal  Goal: *Optimal pain control at patient's stated goal  Outcome: Progressing Towards Goal  Goal: *Participates in infant care  Outcome: Progressing Towards Goal  Goal: *Demonstrates progressive activity  Outcome: Progressing Towards Goal  Goal: *Performs self perineal care  Outcome: Progressing Towards Goal  Goal: *Appropriate parent-infant bonding  Outcome: Progressing Towards Goal  Goal: *Tolerating diet  Outcome: Progressing Towards Goal  Goal: *Performs self breast care  Outcome: Progressing Towards Goal     Problem: Pain  Goal: *Control of Pain  Outcome: Progressing Towards Goal     Problem: Patient Education: Go to Patient Education Activity  Goal: Patient/Family Education  Outcome: Progressing Towards Goal     Problem: Lactation Care Plan  Goal: *Infant latching appropriately  Outcome: Progressing Towards Goal  Goal: *Weight loss less than 10% of birth weight  Outcome: Progressing Towards Goal     Problem: Patient Education: Go to Patient Education Activity  Goal: Patient/Family Education  Outcome: Progressing Towards Goal

## 2021-07-31 NOTE — LACTATION NOTE
This note was copied from a baby's chart. 1912 per mom, infant latched and nursed well 2x. Mom educated on breastfeeding basics--hunger cues, feeding on demand, waking baby if baby sleeps too long between feeds, importance of skin to skin, positioning and latching, risk of pacifier use and supplemental feedings, and importance of rooming in--and use of log sheet. Mom also educated on benefits of breastfeeding for herself and baby. Mom verbalized understanding. No questions at this time. Encouraged to call for assistance with next feeding.

## 2021-07-31 NOTE — PROGRESS NOTES
0720: Bedside and verbal shift change report given to DILSHAD Younger, RN byJULIETA Michaels, RN and Marsha Jackson, RN . Assumed care of pt at this time. 1920: Bedside and verbal shift change report given by Sue German, CRISTI to DARLEEN Salgado, CRISTI.  Relinquished care of pt at this time

## 2021-07-31 NOTE — PROGRESS NOTES
1914:  Assumed care of pt shavonne Chowdhury RN. Pt in room with baby, with no c/o pain. Plan of care discussed with pt, pt expressed understanding. Lactation at bedside with mom and baby.

## 2021-08-01 VITALS
OXYGEN SATURATION: 100 % | TEMPERATURE: 98.1 F | WEIGHT: 205.8 LBS | BODY MASS INDEX: 37.87 KG/M2 | HEIGHT: 62 IN | HEART RATE: 81 BPM | RESPIRATION RATE: 16 BRPM | SYSTOLIC BLOOD PRESSURE: 126 MMHG | DIASTOLIC BLOOD PRESSURE: 80 MMHG

## 2021-08-01 PROBLEM — Z37.9 VACUUM-ASSISTED VAGINAL DELIVERY: Status: ACTIVE | Noted: 2021-08-01

## 2021-08-01 PROBLEM — Z33.1 IUP (INTRAUTERINE PREGNANCY), INCIDENTAL: Status: RESOLVED | Noted: 2019-10-20 | Resolved: 2021-08-01

## 2021-08-01 PROBLEM — Z34.90 PREGNANCY: Status: RESOLVED | Noted: 2021-07-30 | Resolved: 2021-08-01

## 2021-08-01 PROBLEM — Z37.9 NORMAL LABOR: Status: RESOLVED | Noted: 2021-07-30 | Resolved: 2021-08-01

## 2021-08-01 PROBLEM — Z3A.39 39 WEEKS GESTATION OF PREGNANCY: Status: RESOLVED | Noted: 2021-07-30 | Resolved: 2021-08-01

## 2021-08-01 PROCEDURE — 74011250637 HC RX REV CODE- 250/637: Performed by: OBSTETRICS & GYNECOLOGY

## 2021-08-01 RX ORDER — IBUPROFEN 800 MG/1
800 TABLET ORAL
Qty: 30 TABLET | Refills: 1 | Status: SHIPPED | OUTPATIENT
Start: 2021-08-01

## 2021-08-01 RX ADMIN — IBUPROFEN 800 MG: 400 TABLET ORAL at 09:00

## 2021-08-01 NOTE — PROGRESS NOTES
Problem: Patient Education: Go to Patient Education Activity  Goal: Patient/Family Education  Outcome: Resolved/Met     Problem: Pain  Goal: *Control of Pain  Outcome: Resolved/Met     Problem: Patient Education: Go to Patient Education Activity  Goal: Patient/Family Education  Outcome: Resolved/Met     Problem: Lactation Care Plan  Goal: *Infant latching appropriately  Outcome: Resolved/Met  Goal: *Weight loss less than 10% of birth weight  Outcome: Resolved/Met     Problem: Patient Education: Go to Patient Education Activity  Goal: Patient/Family Education  Outcome: Resolved/Met

## 2021-08-01 NOTE — PROGRESS NOTES
0745: Bedside and verbal shift change report given to DILSHAD Younger, RN by DARLEEN Huertas RN . Assumed care of pt at this time. 0900: Assessment completed at this time. 1145: D/C teaching complete and copy of D/C teaching instruction given to pt with verbal understanding. Pt given opportunity for questions and denies comments/concerns/questions at this time. 1209: Bands verified and HUGS tag removed from infant at this time. MOB, FOB carrying infant in car seat escorted to vehicle at this time.

## 2021-08-01 NOTE — DISCHARGE SUMMARY
Obstetrical Discharge Summary     Name: Dianne Mendoza MRN: [id number]  SSN: [id number]    YOB: 1985  Age: 38 y.o. Sex: female      Admit Date: 2021    Discharge Date: 2021    Admitting Physician: Yanet Crawley MD     Attending Physician:  Roxie Jara MD     Discharge Diagnoses: s/p VAVD male infant, shoulder dystocia. Information for the patient's :  Tania Hester [[id number]]   Delivery of a 9 lb 5 oz (4.225 kg) male infant via Vaginal, Vacuum (Extractor) on 2021 at 5:54 PM  by Yanet Crawley. Apgars were 2  and 9 . Additional Diagnoses:   Problem List as of 2021 Date Reviewed: 2021        Codes Class Noted - Resolved    Shoulder dystocia during labor and delivery ICD-10-CM: O66.0  ICD-9-CM: 660.40  2021 - Present        Vacuum-assisted vaginal delivery ICD-10-CM: Z37.9  ICD-9-CM: 669.51  2021 - Present        RESOLVED: Pregnancy ICD-10-CM: Z34.90  ICD-9-CM: V22.2  2021 - 2021        RESOLVED: Normal labor ICD-10-CM: O80, Z37.9  ICD-9-CM: 066  2021 - 2021        RESOLVED: 39 weeks gestation of pregnancy ICD-10-CM: Z3A.39  ICD-9-CM: V22.2  2021 - 2021        RESOLVED: IUP (intrauterine pregnancy), incidental ICD-10-CM: Z33.1  ICD-9-CM: V22.2  10/20/2019 - 2021              Lab Results   Component Value Date/Time    Rubella, External Immune 2021 12:00 AM    GrBStrep, External Negative 07/10/2021 12:00 AM     Recent Labs     21  0436   HGB 9.2*       Hospital Course: Normal hospital course following the delivery. Disposition: home    Discharge Condition: Stable    Patient Instructions:   Current Discharge Medication List      START taking these medications    Details   ibuprofen (MOTRIN) 800 mg tablet Take 1 Tablet by mouth every eight (8) hours as needed for Pain (Pain scale 4-6).   Qty: 30 Tablet, Refills: 1         CONTINUE these medications which have NOT CHANGED    Details   PNV Comb #2-Iron-Omega 3-FA ([facility]ERON DHA) 29-1-250 mg cmpk Take  by mouth. STOP taking these medications       cholecalciferol (Vitamin D3) 25 mcg (1,000 unit) cap Comments:   Reason for Stopping:               Reference my discharge instructions.     Follow-up Appointments   Procedures    FOLLOW UP VISIT Appointment in: 6 Weeks     Standing Status:   Standing     Number of Occurrences:   1     Order Specific Question:   Appointment in     Answer:   6 Weeks        Signed By:  Roosevelt Brian MD     2021                       BST

## 2021-08-01 NOTE — PROGRESS NOTES
Problem: Patient Education: Go to Patient Education Activity  Goal: Patient/Family Education  Outcome: Progressing Towards Goal     Problem: Vaginal Delivery: Day of Delivery-Post delivery  Goal: Activity/Safety  Outcome: Progressing Towards Goal  Goal: Consults, if ordered  Outcome: Progressing Towards Goal  Goal: Nutrition/Diet  Outcome: Progressing Towards Goal  Goal: Discharge Planning  Outcome: Progressing Towards Goal  Goal: Medications  Outcome: Progressing Towards Goal  Goal: Treatments/Interventions/Procedures  Outcome: Progressing Towards Goal  Goal: *Vital signs within defined limits  Outcome: Progressing Towards Goal  Goal: *Labs within defined limits  Outcome: Progressing Towards Goal  Goal: *Hemodynamically stable  Outcome: Progressing Towards Goal  Goal: *Optimal pain control at patient's stated goal  Outcome: Progressing Towards Goal  Goal: *Participates in infant care  Outcome: Progressing Towards Goal  Goal: *Demonstrates progressive activity  Outcome: Progressing Towards Goal  Goal: *Tolerating diet  Outcome: Progressing Towards Goal     Problem: Pain  Goal: *Control of Pain  Outcome: Progressing Towards Goal     Problem: Patient Education: Go to Patient Education Activity  Goal: Patient/Family Education  Outcome: Progressing Towards Goal     Problem: Lactation Care Plan  Goal: *Infant latching appropriately  Outcome: Progressing Towards Goal  Goal: *Weight loss less than 10% of birth weight  Outcome: Progressing Towards Goal     Problem: Patient Education: Go to Patient Education Activity  Goal: Patient/Family Education  Outcome: Progressing Towards Goal     Problem: Vaginal Delivery: Postpartum Day 1  Goal: Activity/Safety  Outcome: Progressing Towards Goal  Goal: Consults, if ordered  Outcome: Progressing Towards Goal  Goal: Diagnostic Test/Procedures  Outcome: Progressing Towards Goal  Goal: Nutrition/Diet  Outcome: Progressing Towards Goal  Goal: Discharge Planning  Outcome: Progressing Towards Goal  Goal: Medications  Outcome: Progressing Towards Goal  Goal: Treatments/Interventions/Procedures  Outcome: Progressing Towards Goal  Goal: Psychosocial  Outcome: Progressing Towards Goal  Goal: *Vital signs within defined limits  Outcome: Progressing Towards Goal  Goal: *Labs within defined limits  Outcome: Progressing Towards Goal  Goal: *Hemodynamically stable  Outcome: Progressing Towards Goal  Goal: *Optimal pain control at patient's stated goal  Outcome: Progressing Towards Goal  Goal: *Participates in infant care  Outcome: Progressing Towards Goal  Goal: *Demonstrates progressive activity  Outcome: Progressing Towards Goal  Goal: *Performs self perineal care  Outcome: Progressing Towards Goal  Goal: *Appropriate parent-infant bonding  Outcome: Progressing Towards Goal  Goal: *Tolerating diet  Outcome: Progressing Towards Goal  Goal: *Performs self breast care  Outcome: Progressing Towards Goal

## 2021-08-01 NOTE — DISCHARGE INSTRUCTIONS

## 2024-05-30 NOTE — PROGRESS NOTES
Post-Partum Day Number 2 Progress Note    Cruzito Laxmi     Assessment:   Hospital Problems  Date Reviewed: 2021        Codes Class Noted POA    Pregnancy ICD-10-CM: Z34.90  ICD-9-CM: V22.2  2021 Yes        Normal labor ICD-10-CM: O80, Z37.9  ICD-9-CM: 832  2021 Yes        39 weeks gestation of pregnancy ICD-10-CM: Z3A.39  ICD-9-CM: V22.2  2021 Yes            Doing well, post partum day 2    Plan:   1. Discharge home today  2. Follow up in office in 6 weeks with Deborah Pastrana MD   3. Post partum activity advised, diet as tolerated  4. Discharge Medications: ibuprofen and medications prior to admission    Information for the patient's :  Sarah Addison [[id number]]   Vaginal, Vacuum (Extractor)    Patient doing well without significant complaint. Voiding without difficulty, normal lochia. Patient would like ibuprofen for pain. She is breast feeding without problems. Current Facility-Administered Medications   Medication Dose Route Frequency    ferrous sulfate tablet 325 mg  1 Tablet Oral EVERY OTHER DAY       Vitals:  Visit Vitals  /80 (BP 1 Location: Left upper arm, BP Patient Position: At rest;Lying)   Pulse 81   Temp 98.1 °F (36.7 °C)   Resp 16   Ht 5' 2\" (1.575 m)   Wt 205 lb 12.8 oz (93.4 kg)   SpO2 100%   Breastfeeding Unknown   BMI 37.64 kg/m²     Temp (24hrs), Av.8 °F (36.6 °C), Min:97.4 °F (36.3 °C), Max:98.1 °F (36.7 °C)      Exam:         Patient without distress. Abdomen soft, fundus firm, nontender                 Lower extremities are negative for swelling, cords or tenderness.     Labs:     Lab Results   Component Value Date/Time    WBC 9.8 2021 03:50 PM    WBC 11.5 10/20/2019 06:20 AM    HGB 9.2 (L) 2021 04:36 AM    HGB 10.2 (L) 2021 03:50 PM    HGB 11.3 (L) 10/21/2019 04:22 AM    HCT 29.0 (L) 2021 04:36 AM    HCT 32.1 (L) 2021 03:50 PM    HCT 35.2 10/21/2019 04:22 AM    PLATELET 976 7350 03:50 PM Referral placed for Dermatology and for screening for ADHD as per patient's request.   PLATELET 885 72/60/6559 06:20 AM       No results found for this or any previous visit (from the past 24 hour(s)).